# Patient Record
Sex: FEMALE | Race: OTHER | NOT HISPANIC OR LATINO
[De-identification: names, ages, dates, MRNs, and addresses within clinical notes are randomized per-mention and may not be internally consistent; named-entity substitution may affect disease eponyms.]

---

## 2019-02-01 ENCOUNTER — RECORD ABSTRACTING (OUTPATIENT)
Age: 64
End: 2019-02-01

## 2019-02-01 DIAGNOSIS — M26.629 ARTHRALGIA OF TEMPOROMANDIBULAR JOINT,: ICD-10-CM

## 2019-02-01 DIAGNOSIS — Z83.3 FAMILY HISTORY OF DIABETES MELLITUS: ICD-10-CM

## 2019-02-01 DIAGNOSIS — Z78.9 OTHER SPECIFIED HEALTH STATUS: ICD-10-CM

## 2019-02-01 RX ORDER — MULTIVITAMIN
TABLET ORAL
Refills: 0 | Status: ACTIVE | COMMUNITY

## 2019-02-01 RX ORDER — ESOMEPRAZOLE MAGNESIUM 40 MG/1
CAPSULE, DELAYED RELEASE ORAL
Refills: 0 | Status: ACTIVE | COMMUNITY

## 2019-02-01 RX ORDER — RANITIDINE HCL 150 MG
CAPSULE ORAL
Refills: 0 | Status: ACTIVE | COMMUNITY

## 2019-02-01 RX ORDER — CYCLOSPORINE 0.5 MG/ML
0.05 EMULSION OPHTHALMIC
Refills: 0 | Status: ACTIVE | COMMUNITY

## 2019-02-01 RX ORDER — TRAMADOL HYDROCHLORIDE 50 MG/1
TABLET, COATED ORAL
Refills: 0 | Status: ACTIVE | COMMUNITY

## 2019-02-01 RX ORDER — TIMOLOL MALEATE 5 MG/ML
SOLUTION OPHTHALMIC
Refills: 0 | Status: ACTIVE | COMMUNITY

## 2019-02-04 ENCOUNTER — APPOINTMENT (OUTPATIENT)
Dept: INTERNAL MEDICINE | Facility: CLINIC | Age: 64
End: 2019-02-04
Payer: MEDICARE

## 2019-02-04 VITALS
HEIGHT: 63 IN | WEIGHT: 175 LBS | TEMPERATURE: 98.6 F | BODY MASS INDEX: 31.01 KG/M2 | DIASTOLIC BLOOD PRESSURE: 80 MMHG | SYSTOLIC BLOOD PRESSURE: 132 MMHG

## 2019-02-04 PROCEDURE — 99213 OFFICE O/P EST LOW 20 MIN: CPT

## 2019-02-04 NOTE — HISTORY OF PRESENT ILLNESS
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 Low back pain seeing dr. Avila--prior epidural--Discussed shingrex and pt given script

## 2019-02-04 NOTE — PHYSICAL EXAM
[No Acute Distress] : no acute distress [Supple] : supple [No Respiratory Distress] : no respiratory distress  [Normal Rate] : normal rate  [Regular Rhythm] : with a regular rhythm

## 2019-02-04 NOTE — HISTORY OF PRESENT ILLNESS
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 Low back pain seeing dr. Avila--prior epidural--Discussed shingrex and pt given script

## 2019-06-20 ENCOUNTER — APPOINTMENT (OUTPATIENT)
Dept: INTERNAL MEDICINE | Facility: CLINIC | Age: 64
End: 2019-06-20
Payer: MEDICARE

## 2019-06-20 ENCOUNTER — NON-APPOINTMENT (OUTPATIENT)
Age: 64
End: 2019-06-20

## 2019-06-20 VITALS
RESPIRATION RATE: 16 BRPM | SYSTOLIC BLOOD PRESSURE: 130 MMHG | HEIGHT: 63 IN | BODY MASS INDEX: 31.36 KG/M2 | DIASTOLIC BLOOD PRESSURE: 85 MMHG | WEIGHT: 177 LBS

## 2019-06-20 PROCEDURE — 93000 ELECTROCARDIOGRAM COMPLETE: CPT

## 2019-06-20 PROCEDURE — G0403: CPT

## 2019-06-20 PROCEDURE — G0444 DEPRESSION SCREEN ANNUAL: CPT | Mod: 59

## 2019-06-20 PROCEDURE — G0402 INITIAL PREVENTIVE EXAM: CPT

## 2019-06-20 PROCEDURE — G0439: CPT

## 2019-06-20 PROCEDURE — 36415 COLL VENOUS BLD VENIPUNCTURE: CPT

## 2019-06-20 NOTE — HEALTH RISK ASSESSMENT
[Very Good] : ~his/her~  mood as very good [No] : No [No falls in past year] : Patient reported no falls in the past year [0] : 2) Feeling down, depressed, or hopeless: Not at all (0) [No Retinopathy] : No retinopathy [Patient reported mammogram was normal] : Patient reported mammogram was normal [Patient reported colonoscopy was normal] : Patient reported colonoscopy was normal [Patient reported PAP Smear was normal] : Patient reported PAP Smear was normal [Patient reported bone density results were abnormal] : Patient reported bone density results were abnormal [Alone] : lives alone [Fully functional (bathing, dressing, toileting, transferring, walking, feeding)] : Fully functional (bathing, dressing, toileting, transferring, walking, feeding) [Fully functional (using the telephone, shopping, preparing meals, housekeeping, doing laundry, using] : Fully functional and needs no help or supervision to perform IADLs (using the telephone, shopping, preparing meals, housekeeping, doing laundry, using transportation, managing medications and managing finances) [Reports normal functional visual acuity (ie: able to read med bottle)] : Reports normal functional visual acuity [Smoke Detector] : smoke detector [Carbon Monoxide Detector] : carbon monoxide detector [Seat Belt] :  uses seat belt [Sunscreen] : uses sunscreen [With Patient/Caregiver] : With Patient/Caregiver [] : No [[id number]] : 0 [EyeExamDate] : 05/19 [Reports changes in hearing] : Reports no changes in hearing [Reports changes in vision] : Reports no changes in vision [Change in mental status noted] : No change in mental status noted [Travel to Developing Areas] : does not  travel to developing areas [Reports changes in dental health] : Reports no changes in dental health [Guns at Home] : no guns at home [Caregiver Concerns] : does not have caregiver concerns [TB Exposure] : is not being exposed to tuberculosis [MammogramDate] : 01/18 [BoneDensityDate] : 5/19 [BoneDensityComments] : osteopenia  t -1.8 hip [PapSmearDate] : 06/17 [ColonoscopyDate] : 05/13 [ColonoscopyComments] : olivia [AdvancecareDate] : 06/19

## 2019-06-20 NOTE — PHYSICAL EXAM
[No Acute Distress] : no acute distress [Well Nourished] : well nourished [Well Developed] : well developed [Well-Appearing] : well-appearing [Normal Sclera/Conjunctiva] : normal sclera/conjunctiva [PERRL] : pupils equal round and reactive to light [EOMI] : extraocular movements intact [Normal Outer Ear/Nose] : the outer ears and nose were normal in appearance [Normal Oropharynx] : the oropharynx was normal [Supple] : supple [No JVD] : no jugular venous distention [Thyroid Normal, No Nodules] : the thyroid was normal and there were no nodules present [No Lymphadenopathy] : no lymphadenopathy [No Respiratory Distress] : no respiratory distress  [Clear to Auscultation] : lungs were clear to auscultation bilaterally [No Accessory Muscle Use] : no accessory muscle use [Normal Rate] : normal rate  [Regular Rhythm] : with a regular rhythm [Normal S1, S2] : normal S1 and S2 [No Murmur] : no murmur heard [No Carotid Bruits] : no carotid bruits [No Abdominal Bruit] : a ~M bruit was not heard ~T in the abdomen [No Edema] : there was no peripheral edema [No Varicosities] : no varicosities [Pedal Pulses Present] : the pedal pulses are present [No Extremity Clubbing/Cyanosis] : no extremity clubbing/cyanosis [No Palpable Aorta] : no palpable aorta [Soft] : abdomen soft [Non Tender] : non-tender [No Masses] : no abdominal mass palpated [Non-distended] : non-distended [No HSM] : no HSM [Normal Bowel Sounds] : normal bowel sounds [No CVA Tenderness] : no CVA  tenderness [Normal Anterior Cervical Nodes] : no anterior cervical lymphadenopathy [Normal Posterior Cervical Nodes] : no posterior cervical lymphadenopathy [No Joint Swelling] : no joint swelling [Grossly Normal Strength/Tone] : grossly normal strength/tone [No Spinal Tenderness] : no spinal tenderness [Normal Gait] : normal gait [No Rash] : no rash [Coordination Grossly Intact] : coordination grossly intact [No Focal Deficits] : no focal deficits [Deep Tendon Reflexes (DTR)] : deep tendon reflexes were 2+ and symmetric [Normal Affect] : the affect was normal [Normal Insight/Judgement] : insight and judgment were intact

## 2019-06-20 NOTE — HISTORY OF PRESENT ILLNESS
[FreeTextEntry1] : cpx [de-identified] : seeing ortho for low back pain ([facility])--some borderline bp's

## 2019-06-24 ENCOUNTER — TRANSCRIPTION ENCOUNTER (OUTPATIENT)
Age: 64
End: 2019-06-24

## 2019-06-24 LAB
25(OH)D3 SERPL-MCNC: 39.8 NG/ML
ALBUMIN SERPL ELPH-MCNC: 4.5 G/DL
ALP BLD-CCNC: 97 U/L
ALT SERPL-CCNC: 19 U/L
ANION GAP SERPL CALC-SCNC: 16 MMOL/L
APPEARANCE: ABNORMAL
AST SERPL-CCNC: 16 U/L
BACTERIA: ABNORMAL
BASOPHILS # BLD AUTO: 0.03 K/UL
BASOPHILS NFR BLD AUTO: 0.3 %
BILIRUB SERPL-MCNC: 0.5 MG/DL
BILIRUBIN URINE: NEGATIVE
BLOOD URINE: NEGATIVE
BUN SERPL-MCNC: 30 MG/DL
CALCIUM SERPL-MCNC: 10.1 MG/DL
CHLORIDE SERPL-SCNC: 105 MMOL/L
CHOLEST SERPL-MCNC: 264 MG/DL
CHOLEST/HDLC SERPL: 3.9 RATIO
CO2 SERPL-SCNC: 21 MMOL/L
COLOR: YELLOW
CREAT SERPL-MCNC: 0.95 MG/DL
EOSINOPHIL # BLD AUTO: 0.1 K/UL
EOSINOPHIL NFR BLD AUTO: 1.1 %
ESTIMATED AVERAGE GLUCOSE: 131 MG/DL
GLUCOSE QUALITATIVE U: NEGATIVE
GLUCOSE SERPL-MCNC: 93 MG/DL
HBA1C MFR BLD HPLC: 6.2 %
HCT VFR BLD CALC: 52.7 %
HDLC SERPL-MCNC: 67 MG/DL
HGB BLD-MCNC: 15.7 G/DL
HYALINE CASTS: 3 /LPF
IMM GRANULOCYTES NFR BLD AUTO: 0.2 %
KETONES URINE: NEGATIVE
LDLC SERPL CALC-MCNC: 173 MG/DL
LEUKOCYTE ESTERASE URINE: NEGATIVE
LYMPHOCYTES # BLD AUTO: 3.09 K/UL
LYMPHOCYTES NFR BLD AUTO: 35.4 %
MAN DIFF?: NORMAL
MCHC RBC-ENTMCNC: 27.5 PG
MCHC RBC-ENTMCNC: 29.8 GM/DL
MCV RBC AUTO: 92.5 FL
MICROSCOPIC-UA: NORMAL
MONOCYTES # BLD AUTO: 0.66 K/UL
MONOCYTES NFR BLD AUTO: 7.6 %
NEUTROPHILS # BLD AUTO: 4.84 K/UL
NEUTROPHILS NFR BLD AUTO: 55.4 %
NITRITE URINE: NEGATIVE
PH URINE: 5.5
PLATELET # BLD AUTO: 316 K/UL
POTASSIUM SERPL-SCNC: 4.6 MMOL/L
PROT SERPL-MCNC: 6.9 G/DL
PROTEIN URINE: NORMAL
RBC # BLD: 5.7 M/UL
RBC # FLD: 14.5 %
RED BLOOD CELLS URINE: 1 /HPF
SODIUM SERPL-SCNC: 142 MMOL/L
SPECIFIC GRAVITY URINE: 1.03
SQUAMOUS EPITHELIAL CELLS: 7 /HPF
TRIGL SERPL-MCNC: 120 MG/DL
TSH SERPL-ACNC: 1.64 UIU/ML
UROBILINOGEN URINE: NORMAL
WBC # FLD AUTO: 8.74 K/UL
WHITE BLOOD CELLS URINE: 3 /HPF

## 2019-07-17 ENCOUNTER — RX RENEWAL (OUTPATIENT)
Age: 64
End: 2019-07-17

## 2019-07-22 ENCOUNTER — MEDICATION RENEWAL (OUTPATIENT)
Age: 64
End: 2019-07-22

## 2019-07-22 RX ORDER — LEVOTHYROXINE SODIUM 0.07 MG/1
75 TABLET ORAL
Qty: 90 | Refills: 1 | Status: DISCONTINUED | COMMUNITY
Start: 2019-02-04 | End: 2019-07-22

## 2019-07-24 ENCOUNTER — RX RENEWAL (OUTPATIENT)
Age: 64
End: 2019-07-24

## 2019-07-24 RX ORDER — LEVOTHYROXINE SODIUM 75 UG/1
75 TABLET ORAL
Qty: 90 | Refills: 1 | Status: ACTIVE | COMMUNITY
Start: 2019-07-24 | End: 1900-01-01

## 2020-02-25 ENCOUNTER — APPOINTMENT (OUTPATIENT)
Dept: INTERNAL MEDICINE | Facility: CLINIC | Age: 65
End: 2020-02-25
Payer: MEDICARE

## 2020-02-25 VITALS
HEIGHT: 63 IN | WEIGHT: 178 LBS | TEMPERATURE: 98.4 F | DIASTOLIC BLOOD PRESSURE: 70 MMHG | BODY MASS INDEX: 31.54 KG/M2 | HEART RATE: 61 BPM | OXYGEN SATURATION: 98 % | SYSTOLIC BLOOD PRESSURE: 112 MMHG

## 2020-02-25 DIAGNOSIS — K21.9 GASTRO-ESOPHAGEAL REFLUX DISEASE W/OUT ESOPHAGITIS: ICD-10-CM

## 2020-02-25 DIAGNOSIS — Z87.898 PERSONAL HISTORY OF OTHER SPECIFIED CONDITIONS: ICD-10-CM

## 2020-02-25 DIAGNOSIS — E03.9 HYPOTHYROIDISM, UNSPECIFIED: ICD-10-CM

## 2020-02-25 DIAGNOSIS — Z86.39 PERSONAL HISTORY OF OTHER ENDOCRINE, NUTRITIONAL AND METABOLIC DISEASE: ICD-10-CM

## 2020-02-25 DIAGNOSIS — Z00.00 ENCOUNTER FOR GENERAL ADULT MEDICAL EXAMINATION W/OUT ABNORMAL FINDINGS: ICD-10-CM

## 2020-02-25 PROCEDURE — 99213 OFFICE O/P EST LOW 20 MIN: CPT

## 2020-02-25 PROCEDURE — G0444 DEPRESSION SCREEN ANNUAL: CPT | Mod: 59

## 2020-02-25 NOTE — HISTORY OF PRESENT ILLNESS
[FreeTextEntry1] : cpx [de-identified] : still some chronic stable low back pain ([place]--occ epidural)--uses nsaid and ultram\par stable medication regimen\par uses ppi regularly

## 2020-02-26 RX ORDER — LEVOTHYROXINE SODIUM 0.07 MG/1
75 TABLET ORAL DAILY
Qty: 90 | Refills: 1 | Status: ACTIVE | COMMUNITY
Start: 1900-01-01 | End: 1900-01-01

## 2020-08-24 ENCOUNTER — APPOINTMENT (OUTPATIENT)
Dept: INTERNAL MEDICINE | Facility: CLINIC | Age: 65
End: 2020-08-24

## 2022-05-24 ENCOUNTER — APPOINTMENT (OUTPATIENT)
Dept: PAIN MANAGEMENT | Facility: CLINIC | Age: 67
End: 2022-05-24
Payer: MEDICARE

## 2022-05-24 VITALS — BODY MASS INDEX: 30.65 KG/M2 | HEIGHT: 63 IN | WEIGHT: 173 LBS

## 2022-05-24 PROCEDURE — 99213 OFFICE O/P EST LOW 20 MIN: CPT

## 2022-05-24 NOTE — HISTORY OF PRESENT ILLNESS
[Lower back] : lower back [Left Leg] : left leg [Dull/Aching] : dull/aching [Intermittent] : intermittent [Rest] : rest [Meds] : meds [Sitting] : sitting [Physical therapy] : physical therapy [Injection therapy] : injection therapy [Standing] : standing [Walking] : walking [Extending back] : extending back [] : no [FreeTextEntry8] : walking [FreeTextEntry9] : stretching  [de-identified] : lifting

## 2022-05-24 NOTE — ASSESSMENT
[FreeTextEntry1] : stable on current medication regimen- will refill meds\par  \par \par Medication Renewal\par \par The patient is stable on current pain medication with analgesia and without notable side effects or any obvious aberrant behaviors exhibited. \par There is clinically meaningful improvement in pain and function that outweighs risks to patient safety. I have discussed risks and realistic benefits of opioid therapy and patient and clinician responsibilities for managing therapy. Pain agreement has been signed. \par Will renew medication today.\par \par Follow up in 3 months or sooner if there are any problems. \par \par Conservative Care\par Continue Home exercises, stretching, activity modification, physical therapy, and conservative care.\par \par  NO NEW FINDINGS\par Patient denies new numbness, tingling, weakness, fevers, chills and bowel/bladder incontinence\par \par  \par I have consulted the  Registry for the purpose of reviewing the patient's controlled substance\par \par

## 2022-05-31 ENCOUNTER — APPOINTMENT (OUTPATIENT)
Dept: PAIN MANAGEMENT | Facility: CLINIC | Age: 67
End: 2022-05-31

## 2022-09-20 ENCOUNTER — APPOINTMENT (OUTPATIENT)
Dept: PAIN MANAGEMENT | Facility: CLINIC | Age: 67
End: 2022-09-20

## 2022-09-20 VITALS — HEIGHT: 63 IN | WEIGHT: 173 LBS | BODY MASS INDEX: 30.65 KG/M2

## 2022-09-20 PROCEDURE — 99214 OFFICE O/P EST MOD 30 MIN: CPT

## 2022-09-20 NOTE — HISTORY OF PRESENT ILLNESS
[Lower back] : lower back [Left Leg] : left leg [6] : 6 [3] : 3 [Dull/Aching] : dull/aching [Intermittent] : intermittent [Rest] : rest [Meds] : meds [Sitting] : sitting [Physical therapy] : physical therapy [Injection therapy] : injection therapy [Standing] : standing [Walking] : walking [Extending back] : extending back [FreeTextEntry1] : rx renewal  [] : no [FreeTextEntry8] : walking [FreeTextEntry9] : stretching  [de-identified] : lifting

## 2023-01-10 ENCOUNTER — APPOINTMENT (OUTPATIENT)
Dept: PAIN MANAGEMENT | Facility: CLINIC | Age: 68
End: 2023-01-10
Payer: MEDICARE

## 2023-01-10 VITALS — BODY MASS INDEX: 31.01 KG/M2 | WEIGHT: 175 LBS | HEIGHT: 63 IN

## 2023-01-10 PROCEDURE — 99214 OFFICE O/P EST MOD 30 MIN: CPT

## 2023-01-10 PROCEDURE — 73502 X-RAY EXAM HIP UNI 2-3 VIEWS: CPT

## 2023-01-10 NOTE — HISTORY OF PRESENT ILLNESS
[Lower back] : lower back [Left Leg] : left leg [6] : 6 [3] : 3 [Dull/Aching] : dull/aching [Intermittent] : intermittent [Rest] : rest [Meds] : meds [Sitting] : sitting [Physical therapy] : physical therapy [Injection therapy] : injection therapy [Standing] : standing [Walking] : walking [Extending back] : extending back [FreeTextEntry1] : rx renewal  [] : no [FreeTextEntry8] : walking [FreeTextEntry9] : stretching  [de-identified] : lifting

## 2023-01-10 NOTE — DISCUSSION/SUMMARY
[Medication Risks Reviewed] : Medication risks reviewed [de-identified] : Medication Renewal\par \par The patient is stable on current pain medication with analgesia and without notable side effects or any obvious aberrant behaviors exhibited. \par There is clinically meaningful improvement in pain and function that outweighs risks to patient safety. I have discussed risks and realistic benefits of opioid therapy and patient and clinician responsibilities for managing therapy. Pain agreement has been signed. \par Will renew medication today.\par \par Follow up in 4-6 weeks\par Follow up in 4-6 weeks or sooner if there are any problems.\par \par Conservative Care\par Continue Home exercises, stretching, activity modification, physical therapy, and conservative care.\par \par  NO NEW FINDINGS\par Patient denies new numbness, tingling, weakness, fevers, chills and bowel/bladder incontinence\par \par  \par I have consulted the  Registry for the purpose of reviewing the patient's controlled substance\par \par

## 2023-01-10 NOTE — DATA REVIEWED
[MRI] : MRI [Lumbar Spine] : lumbar spine [Report was reviewed and noted in the chart] : The report was reviewed and noted in the chart [FreeTextEntry1] : R hip Xray reviewed, joint space appears well maintained. Will consider MRI R hip if no improvement once TFESI completed for MRI findings.

## 2023-01-24 ENCOUNTER — APPOINTMENT (OUTPATIENT)
Dept: PAIN MANAGEMENT | Facility: CLINIC | Age: 68
End: 2023-01-24
Payer: MEDICARE

## 2023-01-24 PROCEDURE — 64484 NJX AA&/STRD TFRM EPI L/S EA: CPT | Mod: RT

## 2023-01-24 PROCEDURE — 64483 NJX AA&/STRD TFRM EPI L/S 1: CPT | Mod: RT

## 2023-01-24 NOTE — PROCEDURE
[FreeTextEntry3] : Date of Service: 01/24/2023 \par \par Account: [id number] \par \par Patient: VICKI STEEN \par \par YOB: 1955 \par \par Age: 67 year \par \par Surgeon:                                                        Robin Avila M.D.\par \par Assistant:                                                       None.\par \par Pre-Operative Diagnosis:                            Lumbosacral Radiculitis (M54.17)     \par \par Post Operative Diagnosis:                          Lumbosacral Radiculitis (M54.17)\par \par Procedure:                                                     Right L4-5, L5-S1 transformational epidural steroid injection under fluoroscopic guidance. \par \par Anesthesia:                                                    MAC\par \par \par This procedure was carried out using fluoroscopic guidance.  The risks and benefits of the procedure were discussed extensively with the patient.  The consent of the patient was obtained and the following procedure was performed. The patient was placed in the prone position on the fluoroscopic table and the lumbar area was prepped and draped in a sterile fashion.\par \par The right L4-5 and L5-S1 neural foramen were identified on right oblique  "august dog" anatomical view at the 6 o' clock position using fluoroscopic guidance, and the area was marked. The overlying skin and subcutaneous structures were anesthetized using sterile technique with 1% Lidocaine.  A 22 gauge spinal needle was directed toward the inferior (6 o'clock) position of the pedicle, which formed the roof of the identified foramen.  Once in the epidural space, after negative aspiration for heme and CSF, 1cc of Omnipaque contrast was injected to confirm epidural location and assess filling defects and rule out intravascular needle placement. \par \par The following contrast flow and epidurogram was observed: no intravascular or intrathecal flow pattern was noted.  No blood or CSF was aspirated. Omnipaque spread appeared to outline the right L4 and L5 nerve roots and spread medially into the epidural space.  \par \par After this, an injectate of 3 cc preservative free normal saline plus 40 mg of depo-medrol was injected in the epidural space at each of the two levels.\par \par The needle was subsequently removed.  Vital signs remained normal.  Pulse oximeter was used throughout the procedure and the patient's pulse and oxygen saturation remained within normal limits.  The patient tolerated the procedure well.  There were no complications.  The patient was instructed to apply ice over the injection sites for twenty minutes every two hours for the next 24 to 48 hours.  The patient was also instructed to contact me immediately if there were any problems.\par \brenton Avila M.D.\par

## 2023-02-07 ENCOUNTER — APPOINTMENT (OUTPATIENT)
Dept: PAIN MANAGEMENT | Facility: CLINIC | Age: 68
End: 2023-02-07
Payer: MEDICARE

## 2023-02-07 VITALS — BODY MASS INDEX: 31.01 KG/M2 | WEIGHT: 175 LBS | HEIGHT: 63 IN

## 2023-02-07 PROCEDURE — 99214 OFFICE O/P EST MOD 30 MIN: CPT

## 2023-02-07 NOTE — ASSESSMENT
[FreeTextEntry1] : R L4-5 L5-S1 TFESI with 80% relief of pain improved ROM and ADLS \par still with some of her normal pain on L Side, but prefers to hold off injections at this time

## 2023-02-07 NOTE — HISTORY OF PRESENT ILLNESS
[Lower back] : lower back [Left Leg] : left leg [6] : 6 [3] : 3 [Dull/Aching] : dull/aching [Intermittent] : intermittent [Rest] : rest [Meds] : meds [Sitting] : sitting [Physical therapy] : physical therapy [Injection therapy] : injection therapy [Standing] : standing [Walking] : walking [Extending back] : extending back [FreeTextEntry1] : 2/7/23: PP FU S/P RT L4-5 L5-S1 TFESI on 1/24/23 with 80% relief \par \par rx renewal  [] : no [FreeTextEntry8] : walking [FreeTextEntry9] : stretching  [de-identified] : lifting

## 2023-04-25 ENCOUNTER — APPOINTMENT (OUTPATIENT)
Dept: PAIN MANAGEMENT | Facility: CLINIC | Age: 68
End: 2023-04-25
Payer: MEDICARE

## 2023-04-25 VITALS — WEIGHT: 176 LBS | BODY MASS INDEX: 31.18 KG/M2 | HEIGHT: 63 IN

## 2023-04-25 PROCEDURE — 99214 OFFICE O/P EST MOD 30 MIN: CPT

## 2023-04-25 NOTE — DISCUSSION/SUMMARY
[Surgical risks reviewed] : Surgical risks reviewed [de-identified] : After discussing various treatment options with the patient including but not limited to oral medications, physical therapy, exercise,\par modalities as well as interventional spinal injections, we have decided with the following plan\par \par I personally reviewed the MRI/CT scan images and agree with the radiologist's report. The\par radiological findings were discussed with the patient.\par \par \par The risks, benefits, contents and alternatives to injection were explained in full to the patient. Risks outlined include but are not\par limited to infection,sepsis, bleeding, post-dural puncture headache, nerve damage, temporary increase in pain, syncopal episode,\par failure to resolve symptoms, allergic reaction, symptom recurrence, and elevation of blood sugar in diabetics. Cortisone may cause\par immunosuppression. Patient understands the risks. All questions were answered. After discussion of options, patient requested\par an injection. Information regarding the injection was given to the patient. Which medications to stop prior to the injection was\par explained to the patient as well.\par \par Follow up in 1-2 weeks post injection for re-evaluation.\par \par  Conservative Care\par Continue Home exercises, stretching, activity modification, physical therapy, and conservative care.\par

## 2023-04-25 NOTE — ASSESSMENT
[FreeTextEntry1] : R L4-5 L5-S1 TFESI with 80% relief of pain improved ROM and ADLS sustained several months \par \par pain is in lower back bl and into RLE\par

## 2023-04-25 NOTE — HISTORY OF PRESENT ILLNESS
[Lower back] : lower back [Left Leg] : left leg [6] : 6 [3] : 3 [Dull/Aching] : dull/aching [Intermittent] : intermittent [Rest] : rest [Meds] : meds [Sitting] : sitting [Physical therapy] : physical therapy [Injection therapy] : injection therapy [Standing] : standing [Walking] : walking [Extending back] : extending back [FreeTextEntry1] : 2/7/23: PP FU S/P RT L4-5 L5-S1 TFESI on 1/24/23 with 80% relief \par \par rx renewal  [] : no [FreeTextEntry8] : walking [FreeTextEntry9] : stretching  [de-identified] : lifting

## 2023-05-09 ENCOUNTER — APPOINTMENT (OUTPATIENT)
Dept: PAIN MANAGEMENT | Facility: CLINIC | Age: 68
End: 2023-05-09
Payer: MEDICARE

## 2023-05-09 PROCEDURE — 64483 NJX AA&/STRD TFRM EPI L/S 1: CPT | Mod: LT

## 2023-05-09 NOTE — PROCEDURE
[FreeTextEntry3] : Date of Service:  05/09/2023 \par \par Account: [id number] \par \par Patient:  VICKI STEEN \par  \par YOB: 1955 \par \par Age:  67 year \par     \par Surgeon:                              Robin Avila M.D.\par \par Assistant:                              None.\par \par Pre-Operative Diagnosis:     Lumbosacral Radiculitis (M54.17)                  \par  \par Post Operative Diagnosis:    Lumbosacral Radiculitis (M54.17)                 \par  \par Procedure:                              Right L4-5 transforaminal epidural steroid injection\par                                                   Left L4-5 transforaminal epidural steroid injection under fluoroscopic guidance. \par \par Anesthesia:                             MAC\par \par This procedure was carried out using fluoroscopic guidance.  The risks and benefits of the procedure were discussed extensively with the patient.  The consent of the patient was obtained and the following procedure was performed. The patient was placed in the prone position on the fluoroscopic table and the lumbar area was prepped and draped in a sterile fashion.\par \par The left L4-5 neural foramen was then identified on left oblique  "august dog" anatomical view at the 6 o' clock position using fluoroscopic guidance, and the area was marked. The overlying skin and subcutaneous structures were anesthetized using sterile technique with 1% Lidocaine.  A 22 gauge spinal needle was directed toward the inferior (6 o'clock) position of the pedicle, which formed the roof of the identified foramen.  Once in the epidural space, after negative aspiration for heme and CSF, 1cc of Omnipaque contrast was injected to confirm epidural location and assess filling defects and rule out intravascular needle placement. \par \par The following contrast flow observed: no intravascular or intrathecal flow pattern was noted.  No blood or CSF was aspirated. Omnipaque spread medially in epidural space.\par \par After this, an injectate of 3 cc preservative free normal saline plus 40 mg of depo-medrol was injected in the epidural space.\par \par The right L4-5 neural foramen was then identified on right oblique "august dog" anatomical view at the 6 o' clock position using fluoroscopic guidance, and the area was marked. The overlying skin and subcutaneous structures were anesthetized using sterile technique with 1% Lidocaine.  A 22 gauge spinal needle was directed toward the inferior (6o'clock) position of the pedicle, which formed the roof of the identified foramen.  Once in the epidural space, after negative aspiration for heme and CSF, 1cc of Omnipaque contrast was injected to confirm epidural location and assess filling defects and rule out intravascular needle placement. \par \par The following contrast flow observed: no intravascular or intrathecal flow pattern was noted.  No blood or CSF was aspirated. Omnipaque spread medially in epidural space. After this, an injectate of 3 cc preservative free normal saline plus 40 mg of depo-medrol was injected in the epidural space.\par \par The needle was subsequently removed.  Vital signs remained normal.  Pulse oximeter was used throughout the procedure and the patient's pulse and oxygen saturation remained within normal limits.  The patient tolerated the procedure well.  There were no complications.  The patient was instructed to apply ice over the injection sites for twenty minutes every two hours for the next 24 to 48 hours.  The patient was also instructed to contact me immediately if there were any problems.\par \par Robin Avila M.D.\par

## 2023-06-01 ENCOUNTER — APPOINTMENT (OUTPATIENT)
Dept: PAIN MANAGEMENT | Facility: CLINIC | Age: 68
End: 2023-06-01
Payer: MEDICARE

## 2023-06-01 VITALS — BODY MASS INDEX: 29.23 KG/M2 | HEIGHT: 63 IN | WEIGHT: 165 LBS

## 2023-06-01 PROCEDURE — 99213 OFFICE O/P EST LOW 20 MIN: CPT

## 2023-06-01 NOTE — DISCUSSION/SUMMARY
[de-identified] : \par After discussing various treatment options with the patient including but not limited to oral medications, physical therapy, exercise, modalities as well as interventional spinal injections, we have decided with the following plan: \par \par - pharmacological: \par   - c/w tramadol 50mg TID prn, #90tabs for 30 day supply, 2 refills, last prescribed 4/25/23. will refill this rx when needed\par   - start gabapentin 300mg TID\par   - c/w celebrex 200mg daily prn\par - Imaging: I personally reviewed the radiological images and agree with the radiologist's report. The radiological findings were discussed with the patient.\par - Interventional: consider repeat b/l L4-5 TFESI if pain worsens.\par - rehabilitative: new referral for PT given\par  \par - follow up 2 months \par \par Bo Marcos MD\par \par

## 2023-06-01 NOTE — PHYSICAL EXAM
[de-identified] : Gen: NAD\par Gait: antalgic\par Psych:  Mood appropriate for given condition\par ROM: limited AROM of the L spine in all planes, full ROM at ankles, knees and hips  \par Sensation: decreased to lt touch over L/R leg, otherwise intact to light touch in all dermatomes tested from L2-S1 bilaterally\par Reflexes: 2+ b/l patella and Achilles\par Palpation: Diffusely tender over lumbar paraspinals  -TTP over the b/l greater trochanters and bilateral SI joint\par Provacative tests: +SLR, and seated root (slump test) on the L/R, neg Gay, RAISA testing, FADIR testing\par \par 				Right	Left\par L2/3	Hip flexion		 5	 5\par L3/4	Knee Extension		 5	 5\par L4/5	Ankle Dorsiflexion 	 5	 5\par L5/S1	Great toe extension	 5	 5\par L4/5	Inversion		 5	 5\par L5/S1	Eversion		 5	 5\par L5/S1	Hip Abudction		 3	 3\par S1/2	Hip Extension		 3	 3\par S1/2	Hip Fexion		 5	 5\par \par

## 2023-06-01 NOTE — HISTORY OF PRESENT ILLNESS
[Left Leg] : left leg [7] : 7 [Dull/Aching] : dull/aching [Intermittent] : intermittent [Household chores] : household chores [Leisure] : leisure [Social interactions] : social interactions [Injection therapy] : injection therapy [Lower back] : lower back [Right Leg] : right leg [Radiating] : radiating [Sharp] : sharp [Shooting] : shooting [Stabbing] : stabbing [Standing] : standing [Walking] : walking [FreeTextEntry1] : Initial HPI:06/01/2023\par Ms. STEEN is a 67 year old F who presents for initial evaluation for low back and leg pain. Pain started 8 weeks ago and pain rated 7/10 or higher. It is not associated with any inciting injury. Pain radiates to down the L/R leg. Pain is described as shooting and electric shock when radiating.  Pain is worsened with sitting, coughing and bearing down. Patient has failed conservative treatment with acetaminophen, NSAIDs, muscle relaxants, and physical therapy/HEP. Patient denies loss of bowel/bladder function, new motor deficits, fevers, or chills. There is associated numbness/tingling. She recently received b/l L4-5 TFESI on 5/9/23 with 50% ongoing relief. Pain was affecting quality of life and ADLs, but has since improved after receiving TFESI. She takes tramadol 50mg TID #90 tabs for 30 day supply, 2 refills, without adverse effect. \par \par Images Reviewed: \par MRI L-spine 5/9/23\par \par Istop Reviewed: \par [id number]\par \par Pain Tx Hx: \par R L4-5, L5-S1 TFESI 1/24/23 - 80% relief\par B/L L4-5 TFESI 05/09/23 - 50% relief \par \par \par  [] : no [de-identified] : 05/09/23 [de-identified] : [place]  [de-identified] : mri

## 2023-08-15 ENCOUNTER — APPOINTMENT (OUTPATIENT)
Dept: PAIN MANAGEMENT | Facility: CLINIC | Age: 68
End: 2023-08-15
Payer: MEDICARE

## 2023-08-15 VITALS — WEIGHT: 165 LBS | BODY MASS INDEX: 29.23 KG/M2 | HEIGHT: 63 IN

## 2023-08-15 PROCEDURE — 99214 OFFICE O/P EST MOD 30 MIN: CPT

## 2023-08-15 NOTE — PHYSICAL EXAM
[de-identified] : Gen: NAD Gait: antalgic Psych:  Mood appropriate for given condition ROM: limited AROM of the L spine in all planes, full ROM at ankles, knees and hips   Sensation: decreased to lt touch over L/R leg, otherwise intact to light touch in all dermatomes tested from L2-S1 bilaterally Reflexes: 2+ b/l patella and Achilles Palpation: Diffusely tender over lumbar paraspinals  -TTP over the b/l greater trochanters and +TTP R SI joint Provacative tests: -SLR, and seated root (slump test) on the L/R, neg Gay, +RAISA testing R, +R gaenslen, +R SI compression, FADIR testing  				Right	Left L2/3	Hip flexion		 5	 5 L3/4	Knee Extension		 5	 5 L4/5	Ankle Dorsiflexion 	 5	 5 L5/S1	Great toe extension	 5	 5 L4/5	Inversion		 5	 5 L5/S1	Eversion		 5	 5 L5/S1	Hip Abudction		 3	 3 S1/2	Hip Extension		 3	 3 S1/2	Hip Fexion		 5	 5

## 2023-08-15 NOTE — DISCUSSION/SUMMARY
[de-identified] : After discussing various treatment options with the patient including but not limited to oral medications, physical therapy, exercise, modalities as well as interventional spinal injections, we have decided with the following plan:   - pharmacological:    - refill tramadol 50mg TID prn, #90tabs for 30 day supply   - refill celebrex 200mg daily prn   - naloxone made available - Imaging: I personally reviewed the radiological images and agree with the radiologist's report. The radiological findings were discussed with the patient. - Interventional: consider R SIJ steroid injection - rehabilitative: c/w PT    - follow up 1 month   - Patient evaluated for risk of misuse of opiates by using Opioid Risk Assessment Tool - Patient is taking opiates for longer than 6 weeks - Signed opioid agreement in chart - patient is on the lowest dose of opioid possible with analgesia, without notable side effects or any obvious aberrant behaviors exhibited, that improves ADL and quality of life - there is clinically meaningful improvement in pain and function that outweighs risks to patient safety. I have discussed risks and realistic benefits of opioid therapy, and patient and clinician responsibilities for managing therapy.  - I reminded the patient that pain medications may impair judgement and function, and that they should not drive, operate machinery, or make important decisions while impaired - The patient was provided Fall Risk counseling due to the prescribed medication(s). The patient is aware of the risks associated with their medication(s) - I have consulted the  Registry for the purpose of reviewing the patient's controlled substance - Continuing conservative care including home exercises, stretching, activity modification, physical therapy was encouraged  Bo Marcos MD  
(4) walks frequently

## 2023-08-15 NOTE — HISTORY OF PRESENT ILLNESS
[Lower back] : lower back [Left Leg] : left leg [Right Leg] : right leg [7] : 7 [Dull/Aching] : dull/aching [Radiating] : radiating [Sharp] : sharp [Shooting] : shooting [Stabbing] : stabbing [Intermittent] : intermittent [Household chores] : household chores [Leisure] : leisure [Social interactions] : social interactions [Injection therapy] : injection therapy [Standing] : standing [Walking] : walking [FreeTextEntry1] : Interval HPI 08/15/2023 Pt returns for medication refill. She continues PT, which helps to improve her pain. Pt continues tramadol and celebrex, which helps to alleviate her pain. She decided not to take gabapentin due to concern for side effects. She reports primarily R buttock pain, aching, throbbing, rated 7/10, aching, throbbing, worse with transitions from sitting to standing. Patient denies loss of bowel/bladder function, new motor deficits, fevers, or chills. There is associated numbness/tingling.  Initial HPI:06/01/2023 Ms. STEEN is a 67 year old F who presents for initial evaluation for low back and leg pain. Pain started 8 weeks ago and pain rated 7/10 or higher. It is not associated with any inciting injury. Pain radiates to down the L/R leg. Pain is described as shooting and electric shock when radiating.  Pain is worsened with sitting, coughing and bearing down. Patient has failed conservative treatment with acetaminophen, NSAIDs, muscle relaxants, and physical therapy/HEP. Patient denies loss of bowel/bladder function, new motor deficits, fevers, or chills. There is associated numbness/tingling. She recently received b/l L4-5 TFESI on 5/9/23 with 50% ongoing relief. Pain was affecting quality of life and ADLs, but has since improved after receiving TFESI. She takes tramadol 50mg TID #90 tabs for 30 day supply, 2 refills, without adverse effect.   Images Reviewed:  MRI L-spine 5/9/23  Istop Reviewed:  [id number]  Pain Tx Hx:  R L4-5, L5-S1 TFESI 1/24/23 - 80% relief B/L L4-5 TFESI 05/09/23 - 50% relief     [] : no [de-identified] : 05/09/23 [de-identified] : winston  [de-identified] : mri

## 2023-08-15 NOTE — REVIEW OF SYSTEMS
Patient seen in office by Dr Joanna Hilton 7/15/2021    Imaging report endorsed to provider for review. [Negative] : Heme/Lymph

## 2023-09-19 ENCOUNTER — APPOINTMENT (OUTPATIENT)
Dept: PAIN MANAGEMENT | Facility: CLINIC | Age: 68
End: 2023-09-19
Payer: MEDICARE

## 2023-09-19 VITALS — BODY MASS INDEX: 29.23 KG/M2 | WEIGHT: 165 LBS | HEIGHT: 63 IN

## 2023-09-19 PROCEDURE — 99214 OFFICE O/P EST MOD 30 MIN: CPT

## 2023-10-17 ENCOUNTER — APPOINTMENT (OUTPATIENT)
Dept: PAIN MANAGEMENT | Facility: CLINIC | Age: 68
End: 2023-10-17
Payer: MEDICARE

## 2023-10-17 VITALS — HEIGHT: 63 IN | WEIGHT: 165 LBS | BODY MASS INDEX: 29.23 KG/M2

## 2023-10-17 PROCEDURE — 99214 OFFICE O/P EST MOD 30 MIN: CPT

## 2023-11-14 ENCOUNTER — APPOINTMENT (OUTPATIENT)
Dept: PAIN MANAGEMENT | Facility: CLINIC | Age: 68
End: 2023-11-14

## 2023-11-14 ENCOUNTER — INPATIENT (INPATIENT)
Facility: HOSPITAL | Age: 68
LOS: 1 days | Discharge: HOME | DRG: 309 | End: 2023-11-16
Attending: STUDENT IN AN ORGANIZED HEALTH CARE EDUCATION/TRAINING PROGRAM | Admitting: STUDENT IN AN ORGANIZED HEALTH CARE EDUCATION/TRAINING PROGRAM
Payer: MEDICARE

## 2023-11-14 VITALS
DIASTOLIC BLOOD PRESSURE: 88 MMHG | RESPIRATION RATE: 19 BRPM | HEIGHT: 62 IN | OXYGEN SATURATION: 97 % | HEART RATE: 114 BPM | WEIGHT: 173.94 LBS | SYSTOLIC BLOOD PRESSURE: 142 MMHG | TEMPERATURE: 98 F

## 2023-11-14 DIAGNOSIS — E78.5 HYPERLIPIDEMIA, UNSPECIFIED: ICD-10-CM

## 2023-11-14 DIAGNOSIS — Z29.9 ENCOUNTER FOR PROPHYLACTIC MEASURES, UNSPECIFIED: ICD-10-CM

## 2023-11-14 DIAGNOSIS — E03.9 HYPOTHYROIDISM, UNSPECIFIED: ICD-10-CM

## 2023-11-14 DIAGNOSIS — H40.053 OCULAR HYPERTENSION, BILATERAL: ICD-10-CM

## 2023-11-14 DIAGNOSIS — M48.00 SPINAL STENOSIS, SITE UNSPECIFIED: ICD-10-CM

## 2023-11-14 DIAGNOSIS — I48.91 UNSPECIFIED ATRIAL FIBRILLATION: ICD-10-CM

## 2023-11-14 DIAGNOSIS — I48.92 UNSPECIFIED ATRIAL FLUTTER: ICD-10-CM

## 2023-11-14 DIAGNOSIS — H40.9 UNSPECIFIED GLAUCOMA: ICD-10-CM

## 2023-11-14 DIAGNOSIS — K21.9 GASTRO-ESOPHAGEAL REFLUX DISEASE WITHOUT ESOPHAGITIS: ICD-10-CM

## 2023-11-14 LAB
ALBUMIN SERPL ELPH-MCNC: 3.7 G/DL — SIGNIFICANT CHANGE UP (ref 3.5–5)
ALBUMIN SERPL ELPH-MCNC: 3.7 G/DL — SIGNIFICANT CHANGE UP (ref 3.5–5)
ALP SERPL-CCNC: 89 U/L — SIGNIFICANT CHANGE UP (ref 40–120)
ALP SERPL-CCNC: 89 U/L — SIGNIFICANT CHANGE UP (ref 40–120)
ALT FLD-CCNC: 34 U/L DA — SIGNIFICANT CHANGE UP (ref 10–60)
ALT FLD-CCNC: 34 U/L DA — SIGNIFICANT CHANGE UP (ref 10–60)
ANION GAP SERPL CALC-SCNC: 5 MMOL/L — SIGNIFICANT CHANGE UP (ref 5–17)
ANION GAP SERPL CALC-SCNC: 5 MMOL/L — SIGNIFICANT CHANGE UP (ref 5–17)
APTT BLD: 31.1 SEC — SIGNIFICANT CHANGE UP (ref 24.5–35.6)
APTT BLD: 31.1 SEC — SIGNIFICANT CHANGE UP (ref 24.5–35.6)
AST SERPL-CCNC: 19 U/L — SIGNIFICANT CHANGE UP (ref 10–40)
AST SERPL-CCNC: 19 U/L — SIGNIFICANT CHANGE UP (ref 10–40)
BASOPHILS # BLD AUTO: 0.02 K/UL — SIGNIFICANT CHANGE UP (ref 0–0.2)
BASOPHILS # BLD AUTO: 0.02 K/UL — SIGNIFICANT CHANGE UP (ref 0–0.2)
BASOPHILS NFR BLD AUTO: 0.2 % — SIGNIFICANT CHANGE UP (ref 0–2)
BASOPHILS NFR BLD AUTO: 0.2 % — SIGNIFICANT CHANGE UP (ref 0–2)
BILIRUB SERPL-MCNC: 0.7 MG/DL — SIGNIFICANT CHANGE UP (ref 0.2–1.2)
BILIRUB SERPL-MCNC: 0.7 MG/DL — SIGNIFICANT CHANGE UP (ref 0.2–1.2)
BUN SERPL-MCNC: 30 MG/DL — HIGH (ref 7–18)
BUN SERPL-MCNC: 30 MG/DL — HIGH (ref 7–18)
CALCIUM SERPL-MCNC: 9.5 MG/DL — SIGNIFICANT CHANGE UP (ref 8.4–10.5)
CALCIUM SERPL-MCNC: 9.5 MG/DL — SIGNIFICANT CHANGE UP (ref 8.4–10.5)
CHLORIDE SERPL-SCNC: 108 MMOL/L — SIGNIFICANT CHANGE UP (ref 96–108)
CHLORIDE SERPL-SCNC: 108 MMOL/L — SIGNIFICANT CHANGE UP (ref 96–108)
CO2 SERPL-SCNC: 28 MMOL/L — SIGNIFICANT CHANGE UP (ref 22–31)
CO2 SERPL-SCNC: 28 MMOL/L — SIGNIFICANT CHANGE UP (ref 22–31)
CREAT SERPL-MCNC: 1.38 MG/DL — HIGH (ref 0.5–1.3)
CREAT SERPL-MCNC: 1.38 MG/DL — HIGH (ref 0.5–1.3)
EGFR: 41 ML/MIN/1.73M2 — LOW
EGFR: 41 ML/MIN/1.73M2 — LOW
EOSINOPHIL # BLD AUTO: 0.05 K/UL — SIGNIFICANT CHANGE UP (ref 0–0.5)
EOSINOPHIL # BLD AUTO: 0.05 K/UL — SIGNIFICANT CHANGE UP (ref 0–0.5)
EOSINOPHIL NFR BLD AUTO: 0.6 % — SIGNIFICANT CHANGE UP (ref 0–6)
EOSINOPHIL NFR BLD AUTO: 0.6 % — SIGNIFICANT CHANGE UP (ref 0–6)
GLUCOSE SERPL-MCNC: 124 MG/DL — HIGH (ref 70–99)
GLUCOSE SERPL-MCNC: 124 MG/DL — HIGH (ref 70–99)
HCT VFR BLD CALC: 49.4 % — HIGH (ref 34.5–45)
HCT VFR BLD CALC: 49.4 % — HIGH (ref 34.5–45)
HGB BLD-MCNC: 16.1 G/DL — HIGH (ref 11.5–15.5)
HGB BLD-MCNC: 16.1 G/DL — HIGH (ref 11.5–15.5)
IMM GRANULOCYTES NFR BLD AUTO: 0.5 % — SIGNIFICANT CHANGE UP (ref 0–0.9)
IMM GRANULOCYTES NFR BLD AUTO: 0.5 % — SIGNIFICANT CHANGE UP (ref 0–0.9)
INR BLD: 0.94 RATIO — SIGNIFICANT CHANGE UP (ref 0.85–1.18)
INR BLD: 0.94 RATIO — SIGNIFICANT CHANGE UP (ref 0.85–1.18)
LIDOCAIN IGE QN: 25 U/L — SIGNIFICANT CHANGE UP (ref 13–75)
LIDOCAIN IGE QN: 25 U/L — SIGNIFICANT CHANGE UP (ref 13–75)
LYMPHOCYTES # BLD AUTO: 2.34 K/UL — SIGNIFICANT CHANGE UP (ref 1–3.3)
LYMPHOCYTES # BLD AUTO: 2.34 K/UL — SIGNIFICANT CHANGE UP (ref 1–3.3)
LYMPHOCYTES # BLD AUTO: 26.7 % — SIGNIFICANT CHANGE UP (ref 13–44)
LYMPHOCYTES # BLD AUTO: 26.7 % — SIGNIFICANT CHANGE UP (ref 13–44)
MAGNESIUM SERPL-MCNC: 2.2 MG/DL — SIGNIFICANT CHANGE UP (ref 1.6–2.6)
MAGNESIUM SERPL-MCNC: 2.2 MG/DL — SIGNIFICANT CHANGE UP (ref 1.6–2.6)
MCHC RBC-ENTMCNC: 28.9 PG — SIGNIFICANT CHANGE UP (ref 27–34)
MCHC RBC-ENTMCNC: 28.9 PG — SIGNIFICANT CHANGE UP (ref 27–34)
MCHC RBC-ENTMCNC: 32.6 GM/DL — SIGNIFICANT CHANGE UP (ref 32–36)
MCHC RBC-ENTMCNC: 32.6 GM/DL — SIGNIFICANT CHANGE UP (ref 32–36)
MCV RBC AUTO: 88.5 FL — SIGNIFICANT CHANGE UP (ref 80–100)
MCV RBC AUTO: 88.5 FL — SIGNIFICANT CHANGE UP (ref 80–100)
MONOCYTES # BLD AUTO: 0.68 K/UL — SIGNIFICANT CHANGE UP (ref 0–0.9)
MONOCYTES # BLD AUTO: 0.68 K/UL — SIGNIFICANT CHANGE UP (ref 0–0.9)
MONOCYTES NFR BLD AUTO: 7.8 % — SIGNIFICANT CHANGE UP (ref 2–14)
MONOCYTES NFR BLD AUTO: 7.8 % — SIGNIFICANT CHANGE UP (ref 2–14)
NEUTROPHILS # BLD AUTO: 5.64 K/UL — SIGNIFICANT CHANGE UP (ref 1.8–7.4)
NEUTROPHILS # BLD AUTO: 5.64 K/UL — SIGNIFICANT CHANGE UP (ref 1.8–7.4)
NEUTROPHILS NFR BLD AUTO: 64.2 % — SIGNIFICANT CHANGE UP (ref 43–77)
NEUTROPHILS NFR BLD AUTO: 64.2 % — SIGNIFICANT CHANGE UP (ref 43–77)
NRBC # BLD: 0 /100 WBCS — SIGNIFICANT CHANGE UP (ref 0–0)
NRBC # BLD: 0 /100 WBCS — SIGNIFICANT CHANGE UP (ref 0–0)
NT-PROBNP SERPL-SCNC: 652 PG/ML — HIGH (ref 0–125)
NT-PROBNP SERPL-SCNC: 652 PG/ML — HIGH (ref 0–125)
PLATELET # BLD AUTO: 317 K/UL — SIGNIFICANT CHANGE UP (ref 150–400)
PLATELET # BLD AUTO: 317 K/UL — SIGNIFICANT CHANGE UP (ref 150–400)
POTASSIUM SERPL-MCNC: 4.2 MMOL/L — SIGNIFICANT CHANGE UP (ref 3.5–5.3)
POTASSIUM SERPL-MCNC: 4.2 MMOL/L — SIGNIFICANT CHANGE UP (ref 3.5–5.3)
POTASSIUM SERPL-SCNC: 4.2 MMOL/L — SIGNIFICANT CHANGE UP (ref 3.5–5.3)
POTASSIUM SERPL-SCNC: 4.2 MMOL/L — SIGNIFICANT CHANGE UP (ref 3.5–5.3)
PROT SERPL-MCNC: 7.3 G/DL — SIGNIFICANT CHANGE UP (ref 6–8.3)
PROT SERPL-MCNC: 7.3 G/DL — SIGNIFICANT CHANGE UP (ref 6–8.3)
PROTHROM AB SERPL-ACNC: 10.7 SEC — SIGNIFICANT CHANGE UP (ref 9.5–13)
PROTHROM AB SERPL-ACNC: 10.7 SEC — SIGNIFICANT CHANGE UP (ref 9.5–13)
RBC # BLD: 5.58 M/UL — HIGH (ref 3.8–5.2)
RBC # BLD: 5.58 M/UL — HIGH (ref 3.8–5.2)
RBC # FLD: 13.1 % — SIGNIFICANT CHANGE UP (ref 10.3–14.5)
RBC # FLD: 13.1 % — SIGNIFICANT CHANGE UP (ref 10.3–14.5)
SARS-COV-2 RNA SPEC QL NAA+PROBE: SIGNIFICANT CHANGE UP
SARS-COV-2 RNA SPEC QL NAA+PROBE: SIGNIFICANT CHANGE UP
SODIUM SERPL-SCNC: 141 MMOL/L — SIGNIFICANT CHANGE UP (ref 135–145)
SODIUM SERPL-SCNC: 141 MMOL/L — SIGNIFICANT CHANGE UP (ref 135–145)
TROPONIN I, HIGH SENSITIVITY RESULT: 5.6 NG/L — SIGNIFICANT CHANGE UP
TROPONIN I, HIGH SENSITIVITY RESULT: 5.6 NG/L — SIGNIFICANT CHANGE UP
TSH SERPL-MCNC: 0.97 UU/ML — SIGNIFICANT CHANGE UP (ref 0.34–4.82)
TSH SERPL-MCNC: 0.97 UU/ML — SIGNIFICANT CHANGE UP (ref 0.34–4.82)
WBC # BLD: 8.77 K/UL — SIGNIFICANT CHANGE UP (ref 3.8–10.5)
WBC # BLD: 8.77 K/UL — SIGNIFICANT CHANGE UP (ref 3.8–10.5)
WBC # FLD AUTO: 8.77 K/UL — SIGNIFICANT CHANGE UP (ref 3.8–10.5)
WBC # FLD AUTO: 8.77 K/UL — SIGNIFICANT CHANGE UP (ref 3.8–10.5)

## 2023-11-14 PROCEDURE — 71045 X-RAY EXAM CHEST 1 VIEW: CPT | Mod: 26

## 2023-11-14 PROCEDURE — 99285 EMERGENCY DEPT VISIT HI MDM: CPT

## 2023-11-14 PROCEDURE — 99223 1ST HOSP IP/OBS HIGH 75: CPT | Mod: GC,AI

## 2023-11-14 RX ORDER — ENOXAPARIN SODIUM 100 MG/ML
80 INJECTION SUBCUTANEOUS EVERY 12 HOURS
Refills: 0 | Status: DISCONTINUED | OUTPATIENT
Start: 2023-11-14 | End: 2023-11-16

## 2023-11-14 RX ORDER — TRAMADOL HYDROCHLORIDE 50 MG/1
1 TABLET ORAL
Refills: 0 | DISCHARGE

## 2023-11-14 RX ORDER — ATORVASTATIN CALCIUM 80 MG/1
1 TABLET, FILM COATED ORAL
Refills: 0 | DISCHARGE

## 2023-11-14 RX ORDER — ATORVASTATIN CALCIUM 80 MG/1
10 TABLET, FILM COATED ORAL AT BEDTIME
Refills: 0 | Status: DISCONTINUED | OUTPATIENT
Start: 2023-11-14 | End: 2023-11-16

## 2023-11-14 RX ORDER — LEVOTHYROXINE SODIUM 125 MCG
1 TABLET ORAL
Refills: 0 | DISCHARGE

## 2023-11-14 RX ORDER — DORZOLAMIDE HYDROCHLORIDE TIMOLOL MALEATE 20; 5 MG/ML; MG/ML
1 SOLUTION/ DROPS OPHTHALMIC
Refills: 0 | Status: DISCONTINUED | OUTPATIENT
Start: 2023-11-14 | End: 2023-11-16

## 2023-11-14 RX ORDER — TRAMADOL HYDROCHLORIDE 50 MG/1
50 TABLET ORAL EVERY 6 HOURS
Refills: 0 | Status: DISCONTINUED | OUTPATIENT
Start: 2023-11-14 | End: 2023-11-16

## 2023-11-14 RX ORDER — METOPROLOL TARTRATE 50 MG
25 TABLET ORAL
Refills: 0 | Status: DISCONTINUED | OUTPATIENT
Start: 2023-11-14 | End: 2023-11-15

## 2023-11-14 RX ORDER — ACETAMINOPHEN 500 MG
650 TABLET ORAL EVERY 6 HOURS
Refills: 0 | Status: DISCONTINUED | OUTPATIENT
Start: 2023-11-14 | End: 2023-11-16

## 2023-11-14 RX ORDER — METOPROLOL TARTRATE 50 MG
5 TABLET ORAL ONCE
Refills: 0 | Status: COMPLETED | OUTPATIENT
Start: 2023-11-14 | End: 2023-11-14

## 2023-11-14 RX ORDER — DORZOLAMIDE HYDROCHLORIDE TIMOLOL MALEATE 20; 5 MG/ML; MG/ML
1 SOLUTION/ DROPS OPHTHALMIC
Refills: 0 | DISCHARGE

## 2023-11-14 RX ORDER — PANTOPRAZOLE SODIUM 20 MG/1
40 TABLET, DELAYED RELEASE ORAL
Refills: 0 | Status: DISCONTINUED | OUTPATIENT
Start: 2023-11-14 | End: 2023-11-16

## 2023-11-14 RX ORDER — LANOLIN ALCOHOL/MO/W.PET/CERES
3 CREAM (GRAM) TOPICAL AT BEDTIME
Refills: 0 | Status: DISCONTINUED | OUTPATIENT
Start: 2023-11-14 | End: 2023-11-16

## 2023-11-14 RX ORDER — SODIUM CHLORIDE 9 MG/ML
1000 INJECTION, SOLUTION INTRAVENOUS ONCE
Refills: 0 | Status: COMPLETED | OUTPATIENT
Start: 2023-11-14 | End: 2023-11-14

## 2023-11-14 RX ORDER — DILTIAZEM HCL 120 MG
10 CAPSULE, EXT RELEASE 24 HR ORAL ONCE
Refills: 0 | Status: DISCONTINUED | OUTPATIENT
Start: 2023-11-14 | End: 2023-11-14

## 2023-11-14 RX ORDER — LEVOTHYROXINE SODIUM 125 MCG
75 TABLET ORAL DAILY
Refills: 0 | Status: DISCONTINUED | OUTPATIENT
Start: 2023-11-14 | End: 2023-11-16

## 2023-11-14 RX ORDER — CYCLOSPORINE 0.5 MG/ML
1 EMULSION OPHTHALMIC
Refills: 0 | DISCHARGE

## 2023-11-14 RX ADMIN — Medication 5 MILLIGRAM(S): at 18:40

## 2023-11-14 RX ADMIN — SODIUM CHLORIDE 500 MILLILITER(S): 9 INJECTION, SOLUTION INTRAVENOUS at 18:39

## 2023-11-14 RX ADMIN — ATORVASTATIN CALCIUM 10 MILLIGRAM(S): 80 TABLET, FILM COATED ORAL at 22:56

## 2023-11-14 RX ADMIN — DORZOLAMIDE HYDROCHLORIDE TIMOLOL MALEATE 1 DROP(S): 20; 5 SOLUTION/ DROPS OPHTHALMIC at 22:57

## 2023-11-14 NOTE — H&P ADULT - ASSESSMENT
Patient is a 68F with PMHx of hypothyroidism, GERD (hiatal hernia), spinal stenosis, HLD, glaucoma and dry eye who was sent from the orthopedic office for tachycardia and A fib on EKG. Patient admitted for new onset atrial flutter.

## 2023-11-14 NOTE — ED PROVIDER NOTE - CCCP TRG CHIEF CMPLNT
[facility] - Emergency Dept  Gynecologic Oncology  H&P    Patient Name: Lyssa Gandara  MRN: [id number]  Admission Date: 1/2/2022  Primary Care Provider: Devin Fields MD   Principal Problem: Acute hypoxemic respiratory failure    Subjective:     Chief Complaint/Reason for Admission: shortness of breath    History of Present Illness:  Lyssa Gandara is a 52 yo with Stage IV LMS with PMH DM2, h/o PE, h/o stroke, asthma, HTN, and HLD who presents with worsening shortness of breath. She had her first round of doxorubicin chemotherapy on 12/27 which she tolerated well. However, on Tuesday 12/28 she experienced worsening SOB. She initially was able to manage at home with increased use of her duonebs, however the SOB ultimately worsened. She is unable to walk without dyspnea and has difficulty breathing at rest. She has a slight nonproductive cough and occasionally feels feverish, although she has not taken her temperature at home. Denies nausea/vomiting, diarrhea/constipation. She is urinating without difficulty. She has light vaginal bleeding that she wears a panty liner for. She reports back pain that is controlled with pain medication at home.  [place] [facility] [place] 15[place] with O2 sats at 85%. She was started on BiPaP in the ED; VSS. She was ultimately transitioned to 2L NC with improvement of O2 sat to 98%. Labs notable for lactate of 2.3, normal troponin/BNP, therapeutic INR 2.9, normal CBC, and elevated glucose of 378. A CTA was performed and demonstrated decreased clot burden of known PE, however enlargement of known left lung met to 14.5 cm, new pleural effusion, and enlargement of pulmonary nodules.    Oncology History   Leiomyosarcoma of uterus   11/26/2021 Imaging Significant Findings     CT C/A/P w/: Multiple bilateral pulmonary metastases. Large uterine mass.  No omental caking, lymphadenopathy, or ascites.      12/2/2021 Biopsy     CT guided biopsy of lung: ER-, MMR? LMS       12/13/2021 -  Chemotherapy     Treatment Summary   Plan Name: OP DOXORUBICIN Q3W  Treatment Goal: Palliative  Status: Active  Start Date: 12/13/2021 (Planned)  End Date: 8/1/2022 (Planned)  Provider: Darrick Archibald MD  Chemotherapy: DOXOrubicin chemo injection 136 mg, 75 mg/m2, Intravenous, Clinic/HOD 1 time, 0 of 12 cycles   12/27/2021-  Chemotherapy C1 Doxorubicin      Hospital Course:  No notes on file    Oncology Treatment Plan:   OP DOXORUBICIN Q3W    Past Medical History:   Diagnosis Date    Asthma     Carotid stenosis     Dyspnea on exertion     Hypertension 3/8/2016    Internal carotid artery dissection 2/26/2017    Long term current use of anticoagulant therapy 3/8/2016    Metastasis to lung 11/27/2021    PE (pulmonary embolism)     Hx of multiple DVT/PE    Presence of IVC filter     Scleroderma     Stroke 02/2017    Type 2 diabetes mellitus without complication      Past Surgical History:   Procedure Laterality Date    ARTERIAL THROMBECTOMY Right 02/26/2017    right extracranial ICA thrombectomy by Interventional Radiology    DANETTE FILTER PLACEMENT       Family History     Problem Relation (Age of Onset)    Breast cancer Mother (60)    Diabetes Mellitus Maternal Uncle    Hypertension Father    No Known Problems Brother, Maternal Grandmother, Maternal Grandfather, Paternal Grandmother, Paternal Grandfather, Maternal Aunt, Paternal Aunt, Paternal Uncle        Tobacco Use    Smoking status: Never Smoker    Smokeless tobacco: Never Used   Substance and Sexual Activity    Alcohol use: Yes     Alcohol/week: 0.0 standard drinks     Comment: 2 beers a day    Drug use: No    Sexual activity: Not Currently     Partners: Male     Birth control/protection: Post-menopausal, None       (Not in a hospital admission)      Review of patient's allergies indicates:   Allergen Reactions    Plasma, human, normal        Review of Systems   Constitutional: Positive for fatigue. Negative for chills and  fever.   Respiratory: Positive for cough and shortness of breath.    Cardiovascular: Negative for chest pain and palpitations.   Gastrointestinal: Negative for abdominal pain, constipation, diarrhea, nausea and vomiting.   Genitourinary: Positive for vaginal bleeding. Negative for dysuria, urgency, vaginal discharge and vaginal pain.   Musculoskeletal: Negative for arthralgias.   Integumentary:  Negative for rash.   Neurological: Negative for syncope and headaches.      Objective:     Vital Signs (Most Recent):  Temp: 98.7 °F (37.1 °C) (01/02/22 2345)  Pulse: (!) 128 (01/02/22 2345)  Resp: (!) 35 (01/02/22 2345)  BP: (!) 140/94 (01/02/22 2345)  SpO2: 98 % (01/02/22 2345) Vital Signs (24h Range):  Temp:  [98.7 °F (37.1 °C)] 98.7 °F (37.1 °C)  Pulse:  [128-147] 128  Resp:  [15-40] 35  SpO2:  [98 %-100 %] 98 %  BP: (140-147)/(33-94) 140/94     Weight: 68.9 kg (152 lb)  Body mass index is 26.93 kg/m².    Physical Exam:   Constitutional: She is oriented to person, place, and time. She appears well-developed and well-nourished. No distress.    HENT:   Head: Normocephalic and atraumatic.    Eyes: EOM are normal.     Cardiovascular: Normal rate.     Pulmonary/Chest: Tachypnea noted. No respiratory distress. She has decreased breath sounds in the left upper field, the left middle field and the left lower field.   Dyspnea noted. Able to speak in full sentences, but pauses frequently. On 2L NC.        Abdominal: Soft. There is no abdominal tenderness.             Musculoskeletal: Normal range of motion.       Neurological: She is alert and oriented to person, place, and time.    Skin: Skin is warm and dry. She is not diaphoretic.    Psychiatric: She has a normal mood and affect.       Laboratory:  CBC:   Recent Labs   Lab 01/02/22 2035 01/02/22 2038   WBC 5.37  --    HGB 11.0*  --    HCT 35.0* 35*   *  --     and CMP:   Recent Labs   Lab 01/02/22 2035      K 5.6*      CO2 20*   *   BUN 18    CREATININE 1.3   CALCIUM 9.7   PROT 8.0   ALBUMIN 3.1*   BILITOT 0.5   ALKPHOS 131   AST 28   ALT 11   ANIONGAP 14   EGFRNONAA 46.3*     PT/INR: 29.3/2.9  Lactate: 2.3  Troponin negative  BNP: 24    Diagnostic Results:  Imaging Results           CTA Chest Non-Coronary (PE Study) (Final result)  Result time 01/02/22 22:31:28    Final result by Teto Ontiveros MD (01/02/22 22:31:28)                 Impression:      1. No large new central pulmonary thromboembolism noting suboptimal bolus timing.  Decreased clot burden in the left pulmonary/left lower lobe arteries with some residual clot.  Chronic occlusion of the right inter lobar artery.  No right heart strain.  2. Interval enlargement of left lower lobe mass now measuring up to 14.5 cm.  Mass extends toward the mediastinum/left hilum as detailed above and results in rightward mediastinal shift.  3. Interval development of left pleural effusion, possibly loculated.  4. Interval enlargement of multiple bilateral pulmonary soft tissue masses.  5. Enlarged prevascular lymph node, new from prior.  6. Small pericardial effusion, new from prior.  This report was flagged in Epic as abnormal.    Electronically signed by resident: Oswaldo Mackay  Date:    01/02/2022  Time:    21:36    Electronically signed by: Teto Ontiveros MD  Date:    01/02/2022  Time:    22:31             Narrative:    EXAMINATION:  CTA CHEST NON CORONARY    CLINICAL HISTORY:  Pulmonary embolism (PE) suspected, high prob;    TECHNIQUE:  Low dose axial images, sagittal and coronal reformations were obtained from the thoracic inlet to the lung bases following the IV administration of 100 mL of Omnipaque 350.  Contrast timing was optimized to evaluate the pulmonary arteries.    COMPARISON:  CTA chest 11/26/2021, 08/05/2016    FINDINGS:  Base of Neck:  No significant abnormality.    Thoracic soft tissues:  No significant abnormality.    Aorta: Left-sided aortic arch with common origin of the  new onset afib brachiocephalic and left common carotid arteries.  The thoracic aorta is normal in caliber without significant calcific atherosclerosis .    Heart: Heart is not enlarged.  New small pericardial effusion. Coronary artery calcific atherosclerosis.    Pulmonary vasculature: Bolus timing suboptimal to evaluate for thromboembolism.  The pulmonary arteries distribute normally.  Decreased clot burden in the left pulmonary/left lower lobe artery with some residual clot.  Assessment of the right upper lobe artery limited by artifact from contrast bolus within the SVC.  Occlusion of the right interlobar artery similar to prior exams dating back to 08/05/2016.  No new large central pulmonary thromboembolism    Kim/Mediastinum:  Rightward mediastinal shift.  Enlarged prevascular lymph node measuring 1.5 cm in short axis (axial series 2, image 151) additional prominent mediastinal lymph nodes.    Airways:  Trachea is midline and proximal airways are patent without significant abnormality.    Lungs: CTA technique sacrifices pulmonary parenchymal fine detail and contrast resolution in order to optimize assessment of pulmonary arteries.  Interval enlargement of left lower lobe mass measuring 6.7 x 11.3 x 14.5 cm (axial series 2, image 260 and coronal series 601, image 165).  The mass extends toward the mediastinum/left hilum and appears to be abutting possibly encasing the left lower lobe pulmonary artery, left inferior pulmonary vein, and left lower lobe bronchus.  Mass is also abutting the aorta and left atrium this mass extends into the posterior left chest wall with suspected involvement of the left paraspinal musculature for an axial series 2, image 196).  Interval development of left pleural effusion which may be loculated given its presence laterally and within the major fissure.  There is associated volume loss mainly in the left lower lobe.  Interval enlargement of multiple bilateral pulmonary soft tissue masses, for  example a right upper lobe mass measuring 3.7 cm (axial series 2, image 102), previously measuring approximately 3.0 cm and left lower lobe mass measuring 3.5 cm (axial series 2, image 281), previously measuring approximately 2.7 cm.  Fibrotic change/scattered bands of subsegmental atelectasis mainly in the right middle and lower lobes.    Esophagus: Normal in course and caliber.    Upper abdomen: Partially visualized cholelithiasis.    Bones:  Degenerative changes of the visualized osseous structures without acute fracture or lytic or sclerotic lesions.                               X-Ray Chest AP Portable (Final result)  Result time 01/02/22 20:33:51    Final result by Teto Ontiveros MD (01/02/22 20:33:51)                 Impression:      Numerous bilateral pulmonary masses, similar to prior.    Interval increase in effusion at the left base.    Interval increased left upper lobe atelectasis partially silhouetting the upper left heart border.      Electronically signed by: Teto Ontiveros MD  Date:    01/02/2022  Time:    20:33             Narrative:    EXAMINATION:  XR CHEST AP PORTABLE    CLINICAL HISTORY:  sob;    TECHNIQUE:  Single frontal view of the chest was performed.    COMPARISON:  12/02/2021.    FINDINGS:  Numerous bilateral pulmonary masses, similar to prior.    Interval increase in effusion at the left base.  Interval increased left upper lobe atelectasis partially silhouetting the upper left heart border.    Heart and lungs otherwise appear unchanged when allowing for differences in technique and positioning.                                  Assessment/Plan:     * Acute hypoxemic respiratory failure  - Pulse:  [128-147] 128, Resp:  [15-40] 35, SpO2:  [98 %-100 %] 98 %  - Initially on 15L > BiPap > 2L NC  - Increased WOB, decreased L Lung sounds  - CTA with enlarged pulmonary mass (14.5cm), small loculated pleural effusion, enlargement of lung nodules, and decrease of known PE volume  - Will admit  for O2 and supportive care  - Currently on 2L NC, titrate as appropriate  - Tessalon pearls PRN for cough  - Anticipate need for home O2      Leiomyosarcoma of uterus  - see full onc history in HPI  - recent diagnosis of Stage IV LMS  - s/p C1 on 12/27/21, well-tolerated    HLD (hyperlipidemia)  - atorvastatin 10 mg qD    Metastasis to lung  - Known mets to lungs including LLL mass  - Interval worsening of pulmonary nodules, new loculated small pleural effusion, increasing size of known mass to 14.5 cm with mediastinal shift  - New O2 requirement, anticipate need for home O2    Cerebral infarction due to thrombosis of right carotid artery 2/26/17  - no residual deficits  - Continue home plavix 75 mg qD    Type 2 diabetes mellitus with renal complication  -  on arrival, given 5u per SSI  - continue determir 20 u qD  - SSI ordered (patient usually does home SSI, does not have scheduled mealtime insulin)  - BG AC qHS  - Last A1C 8.7    Asthma  - continue home albuterol nebs q4 hr  - continue home fluticasone inhaler BID  - continue home combivent rescue inhaler PRN    Essential hypertension  - BP: (140-147)/(33-94) 140/94  - no medications at home  - hydralazine 10 mg PRN for BP >180/110      Personal history of pulmonary embolism  - Decreased size of clot burden on CT  - continue home warfarin 2.5 mg qD  - PT/INR therapeutic at 29.3/2.9  - SCDs  - encourage ambulation when patient able to tolerate        Sonja Sequeira MD  Gynecologic Oncology  [facility] - Emergency Dept

## 2023-11-14 NOTE — H&P ADULT - HISTORY OF PRESENT ILLNESS
Patient is a 68F with PMHx of hypothyroidism, GERD (hiatal hernia), spinal stenosis, HLD, elevated intraocular pressure and dry eye who was sent from the orthopedic office for tachycardia and A fib on EKG. Per the patient, she has been having mild dyspnea on exertion and palpitation the past week, which she thought was due to GERD. She increased her nexium intake, which alleviated some of the symptoms. She went into the orthopedic office for transforaminal epidural steroid injection and was found to be tachycardia and AFib on EKG. Patient has not skipped or changed synthyoid regimen and takes them as prescribed. Denies any sick contacts or recent stressors. Currently, patient denies SOB at rest, dizziness, lightheadedness, fatigue, chest pain, N/V, palpitations, cough.     In ED   T 98.2  , /88  RR 19, O2 97 in RA  Patient is a 68F with PMHx of hypothyroidism, GERD (hiatal hernia), spinal stenosis, HLD, glaucoma and dry eye who was sent from the orthopedic office for tachycardia and A fib on EKG. Per the patient, she has been having mild dyspnea on exertion and palpitation the past week, which she thought was due to GERD. She increased her nexium intake, which alleviated some of the symptoms. She went into the orthopedic office for transforaminal epidural steroid injection and was found to be tachycardia and AFib on EKG. Patient has not skipped or changed synthyoid regimen and takes them as prescribed. Denies any sick contacts or recent stressors. Currently, patient denies SOB at rest, dizziness, lightheadedness, fatigue, chest pain, N/V, palpitations, cough.     In ED   T 98.2  , /88  RR 19, O2 97 in RA  EKG: A flutter with AV block

## 2023-11-14 NOTE — ED PROVIDER NOTE - ENMT, MLM
-- DO NOT REPLY / DO NOT REPLY ALL --  -- Message is from Engagement Center Operations (ECO) --    General Patient Message  Valencia, patient's sister, called stating that patient contacted her this morning stating that she thinks her medications may be off. Stated that she feels like she is high all day and does not like this feeling. She is unsure which medication could be causing this.   Valencia is asking if Krystyna Martínez can take a look at patient's medications and advise what they should do next. Does she need an appointment or change in her medications.     Caller Information       Type Contact Phone/Fax    11/14/2022 11:44 AM CST Phone (Incoming) VALENCIA COREA (Emergency Contact) [phone]        Alternative phone number: n/a    Can a detailed message be left? Yes    Message Turnaround: [place]:    Refer to site's KB page for routing instructions    Please give this turnaround time to the caller:   \"You can expect to receive a response 1-3 business days after your provider's clinical team reviews the message\"              
Writer spoke to patient sister, Lizz.  Lizz  mentioned that the patient stated to her that she feels high all the time. I then contacted the patient, patient reports feeling high and do not like that feeling. Denies chest pain, SOB.    Appointment scheduled  with ISAAC Greenwood 11/18/2022 at 11:30 am.  Message sent to  to assist with arranging transportation services to appointment.    Thank you,  JUDIE Morton  
Airway patent, Nasal mucosa clear. Mouth with normal mucosa. Throat has no vesicles, no oropharyngeal exudates and uvula is midline.

## 2023-11-14 NOTE — H&P ADULT - NSHPREVIEWOFSYSTEMS_GEN_ALL_CORE
CONSTITUTIONAL: No fever, weight loss, or fatigue  EYES: No eye pain, visual disturbances, or discharge  ENT:  No difficulty hearing, tinnitus, vertigo; No sinus or throat pain  NECK: No pain or stiffness  RESPIRATORY: No cough, wheezing, chills or hemoptysis; No Shortness of Breath  CARDIOVASCULAR: No chest pain, palpitations, passing out, dizziness, or leg swelling  GASTROINTESTINAL: No abdominal or epigastric pain. No nausea, vomiting, or hematemesis; No diarrhea or constipation. No melena or hematochezia.  GENITOURINARY: No dysuria, frequency, hematuria, or incontinence  NEUROLOGICAL: No headaches, memory loss, loss of strength, numbness, or tremors  SKIN: No itching, burning, rashes, or lesions   LYMPH Nodes: No enlarged glands  ENDOCRINE: No heat or cold intolerance; No hair loss  MUSCULOSKELETAL: + Chronic back pain; No joint pain or swelling; No muscle pain. No extremity pain  HEME/LYMPH: No easy bruising, or bleeding gums

## 2023-11-14 NOTE — H&P ADULT - NSHPPHYSICALEXAM_GEN_ALL_CORE
GENERAL: NAD, well-groomed, well-developed  HEAD:  Atraumatic, Normocephalic  EYES: EOMI, PERRLA, conjunctiva and sclera clear  ENMT: No tonsillar erythema, exudates, or enlargement; Moist mucous membranes, No lesions  NECK: Supple, normal appearance, No JVD;  NERVOUS SYSTEM:  Alert & Oriented X3,  Motor Strength 5/5 B/L upper and lower extremities, sensation intact  CHEST/LUNG: Lungs clear to auscultation bilaterally, No rales, rhonchi, wheezing   HEART: Irregular rate and rhythm; No murmurs, rubs, or gallops  ABDOMEN: Soft, Nontender, Nondistended; Bowel sounds present  EXTREMITIES:  2+ Peripheral Pulses, No clubbing, cyanosis, or edema  LYMPH: No lymphadenopathy noted  SKIN: No rashes or lesions;  Good capillary refill

## 2023-11-14 NOTE — ED PROVIDER NOTE - OBJECTIVE STATEMENT
68-year-old female history of thyroid disease, hyperlipidemia, chronic back pain presenting with new onset atrial fibrillation detected while back pain management office.  Patient was about to get epidural when new EKG changes were noted.  Patient reports dyspnea on exertion exertion with palpitations that are intermittent.  Reports her last EKG was in February and was not informed of abnormalities at that time. Denies any chest pain, fever, dizziness, leg pain or swelling.  Denies any history of CAD. 68-year-old female history of thyroid disease, hyperlipidemia, chronic back pain presenting with new onset atrial fibrillation detected while back pain management office.  Patient was preparing to get epidural when new EKG changes were noted.  Patient reports dyspnea on exertion exertion with palpitations that are intermittent.  Reports her last EKG was in February and was not informed of abnormalities at that time. Denies any chest pain, fever, dizziness, leg pain or swelling.  Denies any history of CAD.

## 2023-11-14 NOTE — ED ADULT NURSE NOTE - OBJECTIVE STATEMENT
Pt reports she was sent from MD office for elevated HR. Pt reports feeling mild chest palpations and SOB. Denies N/V and dizziness.

## 2023-11-14 NOTE — H&P ADULT - PROBLEM SELECTOR PLAN 1
In ED EKG: A flutter with AV block   s/p lopressor 5mg IVP   c/w lopressor 25mg BID, Lovenox 80mg BID  Consulted Cardio (Kaylee)  f/u echo In ED EKG: A flutter with AV block, new onset   s/p lopressor 5mg IVP   c/w lopressor 25mg BID  start Lovenox 80mg BID  f/u echo  telemetry  Consulted Cardio (Kaylee)

## 2023-11-14 NOTE — H&P ADULT - NSICDXFAMILYHX_GEN_ALL_CORE_FT
FAMILY HISTORY:  Father  Still living? No  FH: type 2 diabetes mellitus, Age at diagnosis: Age Unknown  FHx: myocardial infarction, Age at diagnosis: Age Unknown

## 2023-11-14 NOTE — H&P ADULT - PROBLEM SELECTOR PLAN 4
c/w dorzolemide-timolol eye drop BID  c/w cyclosporine eye 0.05% qd for dry eye c/w dorzolemide-timolol eye drop BID  on cyclosporine eye 0.05% qd for dry eye (pharmacy doesn't carry)

## 2023-11-14 NOTE — H&P ADULT - NSICDXPASTMEDICALHX_GEN_ALL_CORE_FT
PAST MEDICAL HISTORY:  Chronic GERD     HLD (hyperlipidemia)     Hypothyroidism     Raised intraocular pressure, bilateral     Spinal stenosis      PAST MEDICAL HISTORY:  Chronic GERD     Glaucoma, bilateral     HLD (hyperlipidemia)     Hypothyroidism     Spinal stenosis

## 2023-11-14 NOTE — H&P ADULT - ATTENDING COMMENTS
Patient is a 68F with PMHx of hypothyroidism, GERD (hiatal hernia), spinal stenosis, HLD, glaucoma and dry eye who was sent from the orthopedic office for afib with RVR. Was scheduled for epidural injection today, found to be tachycardic, ecg to eval tachycardia shown to have afib and was recommended to follow pcp. PCP then referred to emergency room. Patient has no active chest pain nor shortness of breath. No recent respiratory illness, no complaints regarding urination and bowel movements.    Patient endorsing a chronic use of Cerebrex daily for the past 2 years. Unknown if she has any renal dysfunction but a remote history of a passed kidney stone. Was told to drink more water on previous blood work.    Patient endorsing feeling at usual state of health but with further pressing, admitted to mild sob on exertion and palpitations. She thought these were related to her GERD and possible anxiety. In the Emergency room, she was noted to have aflutter with heart rate ~110-120s. Was originally ordered diltiazem but this was not given and was given metoprolol in its place with improvement in her heartrate to ~.    Physical Exam: NAD, pleasant, accompanied by son. Heart irregular. Lungs clear, abdomen obese, soft nontender, no lower extremity swelling. AAOx4, no focal neurological deficits, moving all extremities spontaneously, fingers no cyanosis, clubbing    # New Onset Atrial Flutter  # Chronic NSAID(TAPIA-2 inhibitor)  use 2/2 Lower back pain  # Acute Kidney Injury  #Erythrocytosis  #Hyperlipidemia  # GERD  #Hypothyroidism      1) New Onset Atrial Flutter  - Rate better controlled with metoprolol IV push, continue po metoprolol 25mg bid  - valvular v non- check echo  - CHADSVASC of 2 for age and sex- start lovenox subcutaenous 1mg/kg bid  - monitor on telemetry    2) Chronic Lower Back Pain  - Patient informed to immediately discontinue celecoxib- new cardiac issue and ori  - acetaminophen and tramadol as needed  - Recommended to initiate gabapentin- patient somewhat anxious, read bad things online regarding  --She can start inpatient and be monitored.    3) Acute Kidney Injury  - Unknown baseline, 1L isotonic fluids was given to patient  - Recheck in AM. Hold off all NSAIDs  - Can check renal lytes    4) Erythrocytosis  - 1L fluids given, recheck CBC in AM,   -If still elevated, may need    5) HLD  - recently started statin, continue    6) hypothyroidism  - Continue thyroid replacement therapy    7) GERD  - ppi therapy          Outpatient ECG which I interpreted as atrial flutter. Reviewed inpatient labs including CBC, CMP, and BNP. Reviewed ECG in the Emergency Room. Case discussed with cardiology.

## 2023-11-14 NOTE — ED PROVIDER NOTE - CLINICAL SUMMARY MEDICAL DECISION MAKING FREE TEXT BOX
68-year-old female with new onset A-fib.  Plan to check labs, EKG, provide medication for rate control and assess

## 2023-11-15 DIAGNOSIS — I48.91 UNSPECIFIED ATRIAL FIBRILLATION: ICD-10-CM

## 2023-11-15 LAB
ANION GAP SERPL CALC-SCNC: 3 MMOL/L — LOW (ref 5–17)
ANION GAP SERPL CALC-SCNC: 3 MMOL/L — LOW (ref 5–17)
BUN SERPL-MCNC: 25 MG/DL — HIGH (ref 7–18)
BUN SERPL-MCNC: 25 MG/DL — HIGH (ref 7–18)
CALCIUM SERPL-MCNC: 9.8 MG/DL — SIGNIFICANT CHANGE UP (ref 8.4–10.5)
CALCIUM SERPL-MCNC: 9.8 MG/DL — SIGNIFICANT CHANGE UP (ref 8.4–10.5)
CHLORIDE SERPL-SCNC: 107 MMOL/L — SIGNIFICANT CHANGE UP (ref 96–108)
CHLORIDE SERPL-SCNC: 107 MMOL/L — SIGNIFICANT CHANGE UP (ref 96–108)
CO2 SERPL-SCNC: 29 MMOL/L — SIGNIFICANT CHANGE UP (ref 22–31)
CO2 SERPL-SCNC: 29 MMOL/L — SIGNIFICANT CHANGE UP (ref 22–31)
CREAT SERPL-MCNC: 1.27 MG/DL — SIGNIFICANT CHANGE UP (ref 0.5–1.3)
CREAT SERPL-MCNC: 1.27 MG/DL — SIGNIFICANT CHANGE UP (ref 0.5–1.3)
EGFR: 45 ML/MIN/1.73M2 — LOW
EGFR: 45 ML/MIN/1.73M2 — LOW
GLUCOSE SERPL-MCNC: 141 MG/DL — HIGH (ref 70–99)
GLUCOSE SERPL-MCNC: 141 MG/DL — HIGH (ref 70–99)
HCT VFR BLD CALC: 48.4 % — HIGH (ref 34.5–45)
HCT VFR BLD CALC: 48.4 % — HIGH (ref 34.5–45)
HCV AB S/CO SERPL IA: 0.07 S/CO — SIGNIFICANT CHANGE UP (ref 0–0.99)
HCV AB S/CO SERPL IA: 0.07 S/CO — SIGNIFICANT CHANGE UP (ref 0–0.99)
HCV AB SERPL-IMP: SIGNIFICANT CHANGE UP
HCV AB SERPL-IMP: SIGNIFICANT CHANGE UP
HGB BLD-MCNC: 15.8 G/DL — HIGH (ref 11.5–15.5)
HGB BLD-MCNC: 15.8 G/DL — HIGH (ref 11.5–15.5)
MAGNESIUM SERPL-MCNC: 2.3 MG/DL — SIGNIFICANT CHANGE UP (ref 1.6–2.6)
MAGNESIUM SERPL-MCNC: 2.3 MG/DL — SIGNIFICANT CHANGE UP (ref 1.6–2.6)
MCHC RBC-ENTMCNC: 28.8 PG — SIGNIFICANT CHANGE UP (ref 27–34)
MCHC RBC-ENTMCNC: 28.8 PG — SIGNIFICANT CHANGE UP (ref 27–34)
MCHC RBC-ENTMCNC: 32.6 GM/DL — SIGNIFICANT CHANGE UP (ref 32–36)
MCHC RBC-ENTMCNC: 32.6 GM/DL — SIGNIFICANT CHANGE UP (ref 32–36)
MCV RBC AUTO: 88.2 FL — SIGNIFICANT CHANGE UP (ref 80–100)
MCV RBC AUTO: 88.2 FL — SIGNIFICANT CHANGE UP (ref 80–100)
NRBC # BLD: 0 /100 WBCS — SIGNIFICANT CHANGE UP (ref 0–0)
NRBC # BLD: 0 /100 WBCS — SIGNIFICANT CHANGE UP (ref 0–0)
PHOSPHATE SERPL-MCNC: 3.2 MG/DL — SIGNIFICANT CHANGE UP (ref 2.5–4.5)
PHOSPHATE SERPL-MCNC: 3.2 MG/DL — SIGNIFICANT CHANGE UP (ref 2.5–4.5)
PLATELET # BLD AUTO: 296 K/UL — SIGNIFICANT CHANGE UP (ref 150–400)
PLATELET # BLD AUTO: 296 K/UL — SIGNIFICANT CHANGE UP (ref 150–400)
POTASSIUM SERPL-MCNC: 3.9 MMOL/L — SIGNIFICANT CHANGE UP (ref 3.5–5.3)
POTASSIUM SERPL-MCNC: 3.9 MMOL/L — SIGNIFICANT CHANGE UP (ref 3.5–5.3)
POTASSIUM SERPL-SCNC: 3.9 MMOL/L — SIGNIFICANT CHANGE UP (ref 3.5–5.3)
POTASSIUM SERPL-SCNC: 3.9 MMOL/L — SIGNIFICANT CHANGE UP (ref 3.5–5.3)
RBC # BLD: 5.49 M/UL — HIGH (ref 3.8–5.2)
RBC # BLD: 5.49 M/UL — HIGH (ref 3.8–5.2)
RBC # FLD: 13.2 % — SIGNIFICANT CHANGE UP (ref 10.3–14.5)
RBC # FLD: 13.2 % — SIGNIFICANT CHANGE UP (ref 10.3–14.5)
SODIUM SERPL-SCNC: 139 MMOL/L — SIGNIFICANT CHANGE UP (ref 135–145)
SODIUM SERPL-SCNC: 139 MMOL/L — SIGNIFICANT CHANGE UP (ref 135–145)
WBC # BLD: 7.5 K/UL — SIGNIFICANT CHANGE UP (ref 3.8–10.5)
WBC # BLD: 7.5 K/UL — SIGNIFICANT CHANGE UP (ref 3.8–10.5)
WBC # FLD AUTO: 7.5 K/UL — SIGNIFICANT CHANGE UP (ref 3.8–10.5)
WBC # FLD AUTO: 7.5 K/UL — SIGNIFICANT CHANGE UP (ref 3.8–10.5)

## 2023-11-15 PROCEDURE — 99233 SBSQ HOSP IP/OBS HIGH 50: CPT | Mod: GC

## 2023-11-15 RX ORDER — SODIUM CHLORIDE 9 MG/ML
500 INJECTION, SOLUTION INTRAVENOUS
Refills: 0 | Status: DISCONTINUED | OUTPATIENT
Start: 2023-11-15 | End: 2023-11-15

## 2023-11-15 RX ORDER — METOPROLOL TARTRATE 50 MG
50 TABLET ORAL
Refills: 0 | Status: DISCONTINUED | OUTPATIENT
Start: 2023-11-15 | End: 2023-11-16

## 2023-11-15 RX ADMIN — TRAMADOL HYDROCHLORIDE 50 MILLIGRAM(S): 50 TABLET ORAL at 22:14

## 2023-11-15 RX ADMIN — DORZOLAMIDE HYDROCHLORIDE TIMOLOL MALEATE 1 DROP(S): 20; 5 SOLUTION/ DROPS OPHTHALMIC at 17:59

## 2023-11-15 RX ADMIN — TRAMADOL HYDROCHLORIDE 50 MILLIGRAM(S): 50 TABLET ORAL at 21:09

## 2023-11-15 RX ADMIN — ENOXAPARIN SODIUM 80 MILLIGRAM(S): 100 INJECTION SUBCUTANEOUS at 17:59

## 2023-11-15 RX ADMIN — DORZOLAMIDE HYDROCHLORIDE TIMOLOL MALEATE 1 DROP(S): 20; 5 SOLUTION/ DROPS OPHTHALMIC at 07:36

## 2023-11-15 RX ADMIN — Medication 75 MICROGRAM(S): at 05:52

## 2023-11-15 RX ADMIN — Medication 50 MILLIGRAM(S): at 17:59

## 2023-11-15 RX ADMIN — TRAMADOL HYDROCHLORIDE 50 MILLIGRAM(S): 50 TABLET ORAL at 14:21

## 2023-11-15 RX ADMIN — Medication 25 MILLIGRAM(S): at 05:52

## 2023-11-15 RX ADMIN — TRAMADOL HYDROCHLORIDE 50 MILLIGRAM(S): 50 TABLET ORAL at 15:29

## 2023-11-15 RX ADMIN — ATORVASTATIN CALCIUM 10 MILLIGRAM(S): 80 TABLET, FILM COATED ORAL at 21:09

## 2023-11-15 RX ADMIN — ENOXAPARIN SODIUM 80 MILLIGRAM(S): 100 INJECTION SUBCUTANEOUS at 05:53

## 2023-11-15 RX ADMIN — PANTOPRAZOLE SODIUM 40 MILLIGRAM(S): 20 TABLET, DELAYED RELEASE ORAL at 05:53

## 2023-11-15 NOTE — PROGRESS NOTE ADULT - SUBJECTIVE AND OBJECTIVE BOX
PGY-1 Progress Note discussed with attending    PAGER #: [[phone]] TILL 5:00 PM  PLEASE CONTACT ON CALL TEAM:  - On Call Team (Please refer to Jose Francisco) FROM 5:00 PM - 8:30PM  - Nightfloat Team FROM 8:30 -7:30 AM    CC: Patient is a 68y old  Female who presents with a chief complaint of New onset atrial Flutter (14 Nov 2023 17:37)      OVERNIGHT EVENTS:    SUBJECTIVE / INTERVAL HPI: Patient seen and examined at bedside.     ROS:  CONSTITUTIONAL: No weakness, fevers or chills  EYES/ENT: No visual changes;  No vertigo or throat pain   NECK: No pain or stiffness  RESPIRATORY: No cough, wheezing, hemoptysis; No shortness of breath  CARDIOVASCULAR: No chest pain or palpitations  GASTROINTESTINAL: No abdominal or epigastric pain. No nausea, vomiting, or hematemesis; No diarrhea or constipation. No melena or hematochezia.  GENITOURINARY: No dysuria, frequency or hematuria  NEUROLOGICAL: No numbness or weakness  SKIN: No itching, burning, rashes, or lesions     VITAL SIGNS:  Vital Signs Last 24 Hrs  T(C): 36.9 (15 Nov 2023 08:39), Max: 37.1 (14 Nov 2023 18:45)  T(F): 98.4 (15 Nov 2023 08:39), Max: 98.8 (14 Nov 2023 18:45)  HR: 74 (15 Nov 2023 08:39) (74 - 130)  BP: 150/92 (15 Nov 2023 08:39) (117/80 - 150/92)  BP(mean): --  RR: 19 (15 Nov 2023 08:39) (18 - 19)  SpO2: 99% (15 Nov 2023 08:39) (97% - 99%)    Parameters below as of 15 Nov 2023 08:39  Patient On (Oxygen Delivery Method): room air        PHYSICAL EXAM:    General: WDWN  HEENT: NC/AT; PERRL, anicteric sclera; MMM  Neck: supple  Cardiovascular: +S1/S2; RRR  Respiratory: CTA B/L; no W/R/R  Gastrointestinal: soft, NT/ND; +BSx4  Extremities: WWP; no edema, clubbing or cyanosis  Vascular: 2+ radial, DP/PT pulses B/L  Skin: Warm, dry, good turgor, no rashes, or ecchymoses  Neurological: AAOx3; no focal deficits    MEDICATIONS:  MEDICATIONS  (STANDING):  atorvastatin 10 milliGRAM(s) Oral at bedtime  dorzolamide 2%/timolol 0.5% Ophthalmic Solution 1 Drop(s) Both EYES two times a day  enoxaparin Injectable 80 milliGRAM(s) SubCutaneous every 12 hours  lactated ringers. 500 milliLiter(s) (100 mL/Hr) IV Continuous <Continuous>  levothyroxine 75 MICROGram(s) Oral daily  metoprolol tartrate 25 milliGRAM(s) Oral two times a day  pantoprazole    Tablet 40 milliGRAM(s) Oral before breakfast    MEDICATIONS  (PRN):  acetaminophen     Tablet .. 650 milliGRAM(s) Oral every 6 hours PRN Temp greater or equal to 38C (100.4F), Mild Pain (1 - 3)  melatonin 3 milliGRAM(s) Oral at bedtime PRN Insomnia  traMADol 50 milliGRAM(s) Oral every 6 hours PRN for moderate pain      ALLERGIES:  Allergies    penicillin (Hives)  macrolide antibiotics (Stomach Upset; Nausea)  sulfa drugs (Hives)    Intolerances        LABS:                        15.8   7.50  )-----------( 296      ( 15 Nov 2023 05:46 )             48.4     11-15    139  |  107  |  25<H>  ----------------------------<  141<H>  3.9   |  29  |  1.27    Ca    9.8      15 Nov 2023 05:46  Phos  3.2     11-15  Mg     2.3     11-15    TPro  7.3  /  Alb  3.7  /  TBili  0.7  /  DBili  x   /  AST  19  /  ALT  34  /  AlkPhos  89  11-14    PT/INR - ( 14 Nov 2023 13:00 )   PT: 10.7 sec;   INR: 0.94 ratio         PTT - ( 14 Nov 2023 13:00 )  PTT:31.1 sec  Urinalysis Basic - ( 15 Nov 2023 05:46 )    Color: x / Appearance: x / SG: x / pH: x  Gluc: 141 mg/dL / Ketone: x  / Bili: x / Urobili: x   Blood: x / Protein: x / Nitrite: x   Leuk Esterase: x / RBC: x / WBC x   Sq Epi: x / Non Sq Epi: x / Bacteria: x      CAPILLARY BLOOD GLUCOSE          RADIOLOGY & ADDITIONAL TESTS: Reviewed. PGY-1 Progress Note discussed with attending    PAGER #: [[phone]] TILL 5:00 PM  PLEASE CONTACT ON CALL TEAM:  - On Call Team (Please refer to Jose Francisco) FROM 5:00 PM - 8:30PM  - Nightfloat Team FROM 8:30 -7:30 AM    CC: Patient is a 68y old  Female who presents with a chief complaint of New onset atrial Flutter (14 Nov 2023 17:37)      OVERNIGHT EVENTS:    SUBJECTIVE / INTERVAL HPI: Patient seen and examined at bedside. Says shortness of breath has improved but still endorsing palpitations. Denies history of hypertension but does mention possible white coat hypertension in the past. Denies headaches, dizziness, chest pain, SOB, nausea, vomiting, diarrhea, and constipation.     ROS:  CONSTITUTIONAL: No weakness, fevers or chills  EYES/ENT: No visual changes;  No vertigo or throat pain   NECK: No pain or stiffness  RESPIRATORY: No cough, wheezing, hemoptysis; No shortness of breath  CARDIOVASCULAR: No chest pain, +palpitations  GASTROINTESTINAL: No abdominal or epigastric pain. No nausea, vomiting, or hematemesis; No diarrhea or constipation. No melena or hematochezia.  GENITOURINARY: No dysuria, frequency or hematuria  NEUROLOGICAL: No numbness or weakness  SKIN: No itching, burning, rashes, or lesions     VITAL SIGNS:  Vital Signs Last 24 Hrs  T(C): 36.9 (15 Nov 2023 08:39), Max: 37.1 (14 Nov 2023 18:45)  T(F): 98.4 (15 Nov 2023 08:39), Max: 98.8 (14 Nov 2023 18:45)  HR: 74 (15 Nov 2023 08:39) (74 - 130)  BP: 150/92 (15 Nov 2023 08:39) (117/80 - 150/92)  BP(mean): --  RR: 19 (15 Nov 2023 08:39) (18 - 19)  SpO2: 99% (15 Nov 2023 08:39) (97% - 99%)    Parameters below as of 15 Nov 2023 08:39  Patient On (Oxygen Delivery Method): room air        PHYSICAL EXAM:    General: WDWN  HEENT: NC/AT; PERRL, anicteric sclera; MMM  Neck: supple  Cardiovascular: tachycardic, irregularly irregular  Respiratory: CTA B/L; no W/R/R  Gastrointestinal: soft, NT/ND; +BSx4  Extremities: WWP; no edema, clubbing or cyanosis  Vascular: 2+ radial, DP/PT pulses B/L  Skin: Warm, dry, good turgor, no rashes, or ecchymoses  Neurological: AAOx3; no focal deficits    MEDICATIONS:  MEDICATIONS  (STANDING):  atorvastatin 10 milliGRAM(s) Oral at bedtime  dorzolamide 2%/timolol 0.5% Ophthalmic Solution 1 Drop(s) Both EYES two times a day  enoxaparin Injectable 80 milliGRAM(s) SubCutaneous every 12 hours  lactated ringers. 500 milliLiter(s) (100 mL/Hr) IV Continuous <Continuous>  levothyroxine 75 MICROGram(s) Oral daily  metoprolol tartrate 25 milliGRAM(s) Oral two times a day  pantoprazole    Tablet 40 milliGRAM(s) Oral before breakfast    MEDICATIONS  (PRN):  acetaminophen     Tablet .. 650 milliGRAM(s) Oral every 6 hours PRN Temp greater or equal to 38C (100.4F), Mild Pain (1 - 3)  melatonin 3 milliGRAM(s) Oral at bedtime PRN Insomnia  traMADol 50 milliGRAM(s) Oral every 6 hours PRN for moderate pain      ALLERGIES:  Allergies    penicillin (Hives)  macrolide antibiotics (Stomach Upset; Nausea)  sulfa drugs (Hives)    Intolerances        LABS:                        15.8   7.50  )-----------( 296      ( 15 Nov 2023 05:46 )             48.4     11-15    139  |  107  |  25<H>  ----------------------------<  141<H>  3.9   |  29  |  1.27    Ca    9.8      15 Nov 2023 05:46  Phos  3.2     11-15  Mg     2.3     11-15    TPro  7.3  /  Alb  3.7  /  TBili  0.7  /  DBili  x   /  AST  19  /  ALT  34  /  AlkPhos  89  11-14    PT/INR - ( 14 Nov 2023 13:00 )   PT: 10.7 sec;   INR: 0.94 ratio         PTT - ( 14 Nov 2023 13:00 )  PTT:31.1 sec  Urinalysis Basic - ( 15 Nov 2023 05:46 )    Color: x / Appearance: x / SG: x / pH: x  Gluc: 141 mg/dL / Ketone: x  / Bili: x / Urobili: x   Blood: x / Protein: x / Nitrite: x   Leuk Esterase: x / RBC: x / WBC x   Sq Epi: x / Non Sq Epi: x / Bacteria: x      CAPILLARY BLOOD GLUCOSE          RADIOLOGY & ADDITIONAL TESTS: Reviewed

## 2023-11-15 NOTE — PATIENT PROFILE ADULT - STATED REASON FOR ADMISSION
Patient was told by her doctor to go to the nearest hospital because her EKG showed that she had Atrial fibrillation.

## 2023-11-15 NOTE — PROGRESS NOTE ADULT - NSPROGADDITIONALINFOA_GEN_ALL_CORE
Seen and examined in the AM, in good spirits. Ambulated around the unit with patient which caused her heart rate to increase to 115. She developed some shortness of breath after 2 rounds around the unit.    Reviewed telemetry readings since admission, reviewed am labs inclubing cbc and cmp. Discussed with cardiology regarding stress test and further management for afib    Physical exam: Pleasant no acute distress, heart appeared regular, lungs are clear, abd soft nontender, no lower extremity edema      1) Atrial Fibrillation with RVR  - afib per EP  - increased metoprolol to 50mg bid for better blood pressure and heart rate control.   - echo results pending  - CHADSVASC of 2 for age and sex- start lovenox subcutaenous 1mg/kg bid  - monitor on telemetry  - appreciate EP and cardio consult, further management depending on stress and echo results    2) Chronic Lower Back Pain  - Patient informed to immediately discontinue celecoxib- new cardiac issue and tj  - acetaminophen and tramadol as needed  - Recommended to initiate gabapentin- patient somewhat anxious, read bad things online regarding  --She can start inpatient and be monitored.    3) Acute Kidney Injury  - Creatinine 1/2023 was 1.02  - Likely has TJ  - 500cc bolus given    4) Erythrocytosis  - 1L fluids given, recheck CBC in AM,   - Check EPO primary vs secondary    5) HLD  - recently started statin, continue    6) hypothyroidism  - Continue thyroid replacement therapy    7) GERD  - ppi therapy

## 2023-11-15 NOTE — PATIENT PROFILE ADULT - FALL HARM RISK - HARM RISK INTERVENTIONS

## 2023-11-15 NOTE — CONSULT NOTE ADULT - SUBJECTIVE AND OBJECTIVE BOX
EP Attending  HISTORY OF PRESENT ILLNESS: HPI:  Patient is a 68F with PMHx of hypothyroidism, GERD (hiatal hernia), spinal stenosis, HLD, glaucoma and dry eye who was sent from the orthopedic office for tachycardia and A fib on EKG. Per the patient, she has been having mild dyspnea on exertion and palpitation the past week, which she thought was due to GERD. She increased her nexium intake, which alleviated some of the symptoms. She went into the orthopedic office for transforaminal epidural steroid injection and was found to be tachycardia and AFib on EKG. Patient has not skipped or changed synthyoid regimen and takes them as prescribed. Denies any sick contacts or recent stressors. Currently, patient denies SOB at rest, dizziness, lightheadedness, fatigue, chest pain, N/V, palpitations, cough.     In ED   T 98.2  , /88  RR 19, O2 97 in RA  EKG: AFIB   (14 Nov 2023 17:37)    Ms Dinner is a pleasant 67yo woman referred from pain mgmt procedure (aborted) due to abnormal preop vitals showing tachycardia. EKG with rapid AFIb.  Has been feeling SOB on exertion, easily fatigued, and some palpitations for about 3 weeks. No fainting, no angina.  Continues to have a 'wet throat' feeling.  Concerned it was GERD-related and did not think it was heart related.  No history of TIA or strokes.  A 10 pt ROS is otherwise negative.    PAST MEDICAL & SURGICAL HISTORY:  HLD (hyperlipidemia)  Spinal stenosis  Hypothyroidism  Chronic GERD  Glaucoma, bilateral    MEDICATIONS  (STANDING):  atorvastatin 10 milliGRAM(s) Oral at bedtime  dorzolamide 2%/timolol 0.5% Ophthalmic Solution 1 Drop(s) Both EYES two times a day  enoxaparin Injectable 80 milliGRAM(s) SubCutaneous every 12 hours  levothyroxine 75 MICROGram(s) Oral daily  metoprolol tartrate 50 milliGRAM(s) Oral two times a day  pantoprazole    Tablet 40 milliGRAM(s) Oral before breakfast    Allergies    penicillin (Hives)  macrolide antibiotics (Stomach Upset; Nausea)  sulfa drugs (Hives)    Intolerances      FAMILY HISTORY:  FH: type 2 diabetes mellitus (Father)    FHx: myocardial infarction (Father)    Non-contributary for premature coronary disease or sudden cardiac death    SOCIAL HISTORY:    [ x] Non-smoker  [ ] Smoker  [ ] Alcohol    PHYSICAL EXAM:  T(C): 36.9 (11-15-23 @ 11:15), Max: 37.1 (11-14-23 @ 18:45)  HR: 96 (11-15-23 @ 11:15) (74 - 130)  BP: 157/100 (11-15-23 @ 11:15) (117/80 - 157/100)  RR: 19 (11-15-23 @ 11:15) (18 - 19)  SpO2: 98% (11-15-23 @ 11:15) (97% - 99%)  Wt(kg): --    General: Well nourished, no acute distress, alert and oriented x 3  Head: normocephalic, no trauma  Neck: no JVD, no bruit, supple, not enlarged  CV: irregular S1S2, no S3, regular rate,no murmurs.    Lungs: clear BL, no rales or wheezes  Abdomen: bowel sounds +, soft, nontender, nondistended  Extremities: no clubbing, cyanosis or edema  Neuro: Moves all 4 extremities, sensation intact x 4 extremities  Skin: warm and moist, normal turgor  Psych: Mood and affect are appropriate for circumstances  MSK: normal range of motion and strength x4 extremities.      TELEMETRY: Coarse AFib.  atrial cycle length is ~170-190s (too fast to be 'flutter', and atrial activation changes every few seconds).	    ECG:  	AF/RVr, narrow QRS    Echo: pending  NST: pending  	  	  LABS:	 	                          15.8   7.50  )-----------( 296      ( 15 Nov 2023 05:46 )             48.4     11-15    139  |  107  |  25<H>  ----------------------------<  141<H>  3.9   |  29  |  1.27    Ca    9.8      15 Nov 2023 05:46  Phos  3.2     11-15  Mg     2.3     11-15    TPro  7.3  /  Alb  3.7  /  TBili  0.7  /  DBili  x   /  AST  19  /  ALT  34  /  AlkPhos  89  11-14  TSH: Thyroid Stimulating Hormone, Serum: 0.97 uU/mL (11-14 @ 13:00)    ASSESSMENT/PLAN: Ms Dinner is a very pleasant 68y Female sent in for abnormal vital signs pre- pain mgmt procedure.  New diagnosis of rapid AFib. Symptomatic with palpitations, SOB/easy fatigue on exertion, no fainting or angina.  Echo done, pending results. LA looks dilated. Awaiting nuclear stress test.  Continue Lovenox for stroke prevention, and continue metoprolol at current dose for heartrate control.  Further invasive workup depends on LVEF%/ valve function, and stress test findings.  If her symptoms improve on rate control, and noninvasive cardiac testing is reassuring, she could go home on a DOAC + rate control.  If she either fails to improve to normal level of function, or has heart failure / CAD, she should undergo MAKENZIE+Cardioversion before discharge.  She does not have a personal vehicle and requests followup in the [place] area - Dr Benavides on Thursdays.  It will be harder to schedule elective EP/Interventional procedures after she is discharged.  Will follow with you.  Maintain telemetry and maintain K >4, Mg>2.    Shaun Bello M.D.  Cardiac Electrophysiology    office [phone]  pager [phone]
C A R D I O L O G Y  *********************    DATE OF SERVICE: 11-15-23    HISTORY OF PRESENT ILLNESS:    Patient is a 68F with PMHx of hypothyroidism, GERD (hiatal hernia), spinal stenosis, HLD, glaucoma and dry eye who was sent from the orthopedic office for tachycardia and A fib on EKG. Per the patient, she has been having mild dyspnea on exertion and palpitation the past week, which she thought was due to GERD. She increased her nexium intake, which alleviated some of the symptoms. She went into the orthopedic office for transforaminal epidural steroid injection and was found to be tachycardia and AFib on EKG. Patient has not skipped or changed synthyoid regimen and takes them as prescribed. Denies any sick contacts or recent stressors. Currently, patient denies SOB at rest, dizziness, lightheadedness, fatigue, chest pain, N/V, palpitations, cough.   Denies known hx of CAD.  Last sters  12 years ago.  No dizziness of LOC    In ED   T 98.2  , /88  RR 19, O2 97 in RA  EKG: A flutter with AV block  (14 Nov 2023 17:37)      PAST MEDICAL & SURGICAL HISTORY:    HLD (hyperlipidemia)  Spinal stenosis  Hypothyroidism  Chronic GERD  Glaucoma, bilateral      MEDICATIONS:  MEDICATIONS  (STANDING):  atorvastatin 10 milliGRAM(s) Oral at bedtime  dorzolamide 2%/timolol 0.5% Ophthalmic Solution 1 Drop(s) Both EYES two times a day  enoxaparin Injectable 80 milliGRAM(s) SubCutaneous every 12 hours  lactated ringers. 500 milliLiter(s) (100 mL/Hr) IV Continuous <Continuous>  levothyroxine 75 MICROGram(s) Oral daily  metoprolol tartrate 25 milliGRAM(s) Oral two times a day  pantoprazole    Tablet 40 milliGRAM(s) Oral before breakfast      Allergies    penicillin (Hives)  macrolide antibiotics (Stomach Upset; Nausea)  sulfa drugs (Hives)    Intolerances        FAMILY HISTORY:  FH: type 2 diabetes mellitus (Father)    FHx: myocardial infarction (Father)      Non-contributary for premature coronary disease or sudden cardiac death    SOCIAL HISTORY:    [X ] Non-smoker  [ ] Smoker  [ ] Alcohol      REVIEW OF SYSTEMS:  [ ]chest pain  [ X ]shortness of breath  [ X ]palpitations  [  ]syncope  [ ]near syncope [ ]upper extremity weakness   [ ] lower extremity weakness  [  ]diplopia  [  ]altered mental status   [  ]fevers  [ ]chills [ ]nausea  [ ]vomitting  [  ]dysphagia    [ ]abdominal pain  [ ]melena  [ ]BRBPR    [  ]epistaxis  [  ]rash    [ ]lower extremity edema        [X] All others negative	  [ ] Unable to obtain      LABS:	 	    CARDIAC MARKERS:        Troponin I, High Sensitivity Result: 5.6 ng/L (11-14-23 @ 13:00)                            15.8   7.50  )-----------( 296      ( 15 Nov 2023 05:46 )             48.4     Hb Trend: 15.8<--, 16.1<--    11-15    139  |  107  |  25<H>  ----------------------------<  141<H>  3.9   |  29  |  1.27    Ca    9.8      15 Nov 2023 05:46  Phos  3.2     11-15  Mg     2.3     11-15    TPro  7.3  /  Alb  3.7  /  TBili  0.7  /  DBili  x   /  AST  19  /  ALT  34  /  AlkPhos  89  11-14    Creatinine Trend: 1.27<--, 1.38<--    TSH: Thyroid Stimulating Hormone, Serum: 0.97 uU/mL (11-14 @ 13:00)          PHYSICAL EXAM:  T(C): 36.9 (11-15-23 @ 08:39), Max: 37.1 (11-14-23 @ 18:45)  HR: 74 (11-15-23 @ 08:39) (74 - 130)  BP: 150/92 (11-15-23 @ 08:39) (117/80 - 150/92)  RR: 19 (11-15-23 @ 08:39) (18 - 19)  SpO2: 99% (11-15-23 @ 08:39) (97% - 99%)  Wt(kg): --   BMI (kg/m2): 31.8 (11-14-23 @ 12:18)  I&O's Summary    HEENT:  (-)icterus (-)pallor  CV: N S1 S2 1/6 QUINTEN (+)2 Pulses B/l  Resp:  Clear to ausculatation B/L, normal effort  GI: (+) BS Soft, NT, ND  Lymph:  (-)Edema, (-)obvious lymphadenopathy  Skin: Warm to touch, Normal turgor  Psych: Appropriate mood and affect        TELEMETRY: 	  afib    ECG:  	Aflutter 108 BPM, nonspecific T wave abnormality     RADIOLOGY:         CXR:   NONE    ASSESSMENT/PLAN: 	68y Female PMHx of hypothyroidism, GERD (hiatal hernia), spinal stenosis, HLD, glaucoma and dry eye who was sent from the orthopedic office for tachycardia and A fib on EKG.    # Afib  - Remains in afib this AM  - cont tele  - Therapeutic Lovenox  - Cont Lopressor 25 mg PO BID  - Echo  - check TSH    # SOB  - Plan for nuclear stress if echo wnl    I once again thank you for allowing me to participate in the care of your patient.  If you have any questions or concerns please do not hesitate to contact me.    Nathan Page MD, [facility]  BEEPER [phone]

## 2023-11-15 NOTE — PATIENT PROFILE ADULT - DO YOU FEEL UNSAFE AT HOME, WORK, OR SCHOOL?
Abdomen , soft, nontender, nondistended , no guarding or rigidity , no masses palpable , normal bowel sounds , no clear evidence of ascites, Liver and Spleen , no hepatosplenomegaly
no

## 2023-11-15 NOTE — PROGRESS NOTE ADULT - PROBLEM SELECTOR PLAN 1
In ED EKG: A flutter with AV block, new onset   s/p lopressor 5mg IVP   c/w lopressor 25mg BID  start Lovenox 80mg BID  f/u echo  telemetry  Consulted Cardio (Kaylee) In ED EKG: A flutter with variable AV block, new onset   telemetry consistent with Afib not Aflutter  s/p lopressor 5mg IVP   increase lopressor from 25 to 50mg BID  c/w Lovenox 80mg BID  CHADSVASC=2 (possibly 3 if undiagnosed HTN)  c/w telemetry  Consulted Cardio/EP (Kaylee/Bello)  f/u stress test  f/u echo  rate control vs. cardioversion vs cath pending results

## 2023-11-15 NOTE — PATIENT PROFILE ADULT - NSPRESCRALCFREQ_GEN_A_NUR
-Pt. With persistent erythema and pain to R eye since recent herpes zoster opthalmicus infection. Reports competing treatment but did not f/u with opthalmology and seems to have poor compliance history  - ophthalmology consulted, do not suspect active infection  - Recommend erythromycin ointment, artificial tears, and warm compresses   Never

## 2023-11-16 ENCOUNTER — TRANSCRIPTION ENCOUNTER (OUTPATIENT)
Age: 68
End: 2023-11-16

## 2023-11-16 VITALS
OXYGEN SATURATION: 97 % | RESPIRATION RATE: 18 BRPM | HEART RATE: 67 BPM | DIASTOLIC BLOOD PRESSURE: 81 MMHG | SYSTOLIC BLOOD PRESSURE: 146 MMHG | TEMPERATURE: 99 F

## 2023-11-16 LAB
A1C WITH ESTIMATED AVERAGE GLUCOSE RESULT: 6.4 % — HIGH (ref 4–5.6)
A1C WITH ESTIMATED AVERAGE GLUCOSE RESULT: 6.4 % — HIGH (ref 4–5.6)
ANION GAP SERPL CALC-SCNC: 4 MMOL/L — LOW (ref 5–17)
ANION GAP SERPL CALC-SCNC: 4 MMOL/L — LOW (ref 5–17)
BUN SERPL-MCNC: 24 MG/DL — HIGH (ref 7–18)
BUN SERPL-MCNC: 24 MG/DL — HIGH (ref 7–18)
CALCIUM SERPL-MCNC: 9.7 MG/DL — SIGNIFICANT CHANGE UP (ref 8.4–10.5)
CALCIUM SERPL-MCNC: 9.7 MG/DL — SIGNIFICANT CHANGE UP (ref 8.4–10.5)
CHLORIDE SERPL-SCNC: 108 MMOL/L — SIGNIFICANT CHANGE UP (ref 96–108)
CHLORIDE SERPL-SCNC: 108 MMOL/L — SIGNIFICANT CHANGE UP (ref 96–108)
CHOLEST SERPL-MCNC: 198 MG/DL — SIGNIFICANT CHANGE UP
CHOLEST SERPL-MCNC: 198 MG/DL — SIGNIFICANT CHANGE UP
CO2 SERPL-SCNC: 29 MMOL/L — SIGNIFICANT CHANGE UP (ref 22–31)
CO2 SERPL-SCNC: 29 MMOL/L — SIGNIFICANT CHANGE UP (ref 22–31)
CREAT SERPL-MCNC: 1.26 MG/DL — SIGNIFICANT CHANGE UP (ref 0.5–1.3)
CREAT SERPL-MCNC: 1.26 MG/DL — SIGNIFICANT CHANGE UP (ref 0.5–1.3)
EGFR: 46 ML/MIN/1.73M2 — LOW
EGFR: 46 ML/MIN/1.73M2 — LOW
ESTIMATED AVERAGE GLUCOSE: 137 MG/DL — HIGH (ref 68–114)
ESTIMATED AVERAGE GLUCOSE: 137 MG/DL — HIGH (ref 68–114)
GLUCOSE SERPL-MCNC: 117 MG/DL — HIGH (ref 70–99)
GLUCOSE SERPL-MCNC: 117 MG/DL — HIGH (ref 70–99)
HCT VFR BLD CALC: 46.9 % — HIGH (ref 34.5–45)
HCT VFR BLD CALC: 46.9 % — HIGH (ref 34.5–45)
HDLC SERPL-MCNC: 63 MG/DL — SIGNIFICANT CHANGE UP
HDLC SERPL-MCNC: 63 MG/DL — SIGNIFICANT CHANGE UP
HGB BLD-MCNC: 15.1 G/DL — SIGNIFICANT CHANGE UP (ref 11.5–15.5)
HGB BLD-MCNC: 15.1 G/DL — SIGNIFICANT CHANGE UP (ref 11.5–15.5)
LIPID PNL WITH DIRECT LDL SERPL: 107 MG/DL — HIGH
LIPID PNL WITH DIRECT LDL SERPL: 107 MG/DL — HIGH
MAGNESIUM SERPL-MCNC: 2.4 MG/DL — SIGNIFICANT CHANGE UP (ref 1.6–2.6)
MAGNESIUM SERPL-MCNC: 2.4 MG/DL — SIGNIFICANT CHANGE UP (ref 1.6–2.6)
MCHC RBC-ENTMCNC: 28.7 PG — SIGNIFICANT CHANGE UP (ref 27–34)
MCHC RBC-ENTMCNC: 28.7 PG — SIGNIFICANT CHANGE UP (ref 27–34)
MCHC RBC-ENTMCNC: 32.2 GM/DL — SIGNIFICANT CHANGE UP (ref 32–36)
MCHC RBC-ENTMCNC: 32.2 GM/DL — SIGNIFICANT CHANGE UP (ref 32–36)
MCV RBC AUTO: 89.2 FL — SIGNIFICANT CHANGE UP (ref 80–100)
MCV RBC AUTO: 89.2 FL — SIGNIFICANT CHANGE UP (ref 80–100)
NON HDL CHOLESTEROL: 135 MG/DL — HIGH
NON HDL CHOLESTEROL: 135 MG/DL — HIGH
NRBC # BLD: 0 /100 WBCS — SIGNIFICANT CHANGE UP (ref 0–0)
NRBC # BLD: 0 /100 WBCS — SIGNIFICANT CHANGE UP (ref 0–0)
PHOSPHATE SERPL-MCNC: 4 MG/DL — SIGNIFICANT CHANGE UP (ref 2.5–4.5)
PHOSPHATE SERPL-MCNC: 4 MG/DL — SIGNIFICANT CHANGE UP (ref 2.5–4.5)
PLATELET # BLD AUTO: 285 K/UL — SIGNIFICANT CHANGE UP (ref 150–400)
PLATELET # BLD AUTO: 285 K/UL — SIGNIFICANT CHANGE UP (ref 150–400)
POTASSIUM SERPL-MCNC: 4.1 MMOL/L — SIGNIFICANT CHANGE UP (ref 3.5–5.3)
POTASSIUM SERPL-MCNC: 4.1 MMOL/L — SIGNIFICANT CHANGE UP (ref 3.5–5.3)
POTASSIUM SERPL-SCNC: 4.1 MMOL/L — SIGNIFICANT CHANGE UP (ref 3.5–5.3)
POTASSIUM SERPL-SCNC: 4.1 MMOL/L — SIGNIFICANT CHANGE UP (ref 3.5–5.3)
RBC # BLD: 5.26 M/UL — HIGH (ref 3.8–5.2)
RBC # BLD: 5.26 M/UL — HIGH (ref 3.8–5.2)
RBC # FLD: 13.4 % — SIGNIFICANT CHANGE UP (ref 10.3–14.5)
RBC # FLD: 13.4 % — SIGNIFICANT CHANGE UP (ref 10.3–14.5)
SODIUM SERPL-SCNC: 141 MMOL/L — SIGNIFICANT CHANGE UP (ref 135–145)
SODIUM SERPL-SCNC: 141 MMOL/L — SIGNIFICANT CHANGE UP (ref 135–145)
TRIGL SERPL-MCNC: 138 MG/DL — SIGNIFICANT CHANGE UP
TRIGL SERPL-MCNC: 138 MG/DL — SIGNIFICANT CHANGE UP
WBC # BLD: 7.9 K/UL — SIGNIFICANT CHANGE UP (ref 3.8–10.5)
WBC # BLD: 7.9 K/UL — SIGNIFICANT CHANGE UP (ref 3.8–10.5)
WBC # FLD AUTO: 7.9 K/UL — SIGNIFICANT CHANGE UP (ref 3.8–10.5)
WBC # FLD AUTO: 7.9 K/UL — SIGNIFICANT CHANGE UP (ref 3.8–10.5)

## 2023-11-16 PROCEDURE — 99285 EMERGENCY DEPT VISIT HI MDM: CPT

## 2023-11-16 PROCEDURE — 87635 SARS-COV-2 COVID-19 AMP PRB: CPT

## 2023-11-16 PROCEDURE — G0452: CPT | Mod: 26

## 2023-11-16 PROCEDURE — 85610 PROTHROMBIN TIME: CPT

## 2023-11-16 PROCEDURE — 83690 ASSAY OF LIPASE: CPT

## 2023-11-16 PROCEDURE — 78452 HT MUSCLE IMAGE SPECT MULT: CPT

## 2023-11-16 PROCEDURE — 84484 ASSAY OF TROPONIN QUANT: CPT

## 2023-11-16 PROCEDURE — 84100 ASSAY OF PHOSPHORUS: CPT

## 2023-11-16 PROCEDURE — 85027 COMPLETE CBC AUTOMATED: CPT

## 2023-11-16 PROCEDURE — 80048 BASIC METABOLIC PNL TOTAL CA: CPT

## 2023-11-16 PROCEDURE — 93017 CV STRESS TEST TRACING ONLY: CPT

## 2023-11-16 PROCEDURE — 93306 TTE W/DOPPLER COMPLETE: CPT

## 2023-11-16 PROCEDURE — 99239 HOSP IP/OBS DSCHRG MGMT >30: CPT

## 2023-11-16 PROCEDURE — 83036 HEMOGLOBIN GLYCOSYLATED A1C: CPT

## 2023-11-16 PROCEDURE — 85730 THROMBOPLASTIN TIME PARTIAL: CPT

## 2023-11-16 PROCEDURE — 81270 JAK2 GENE: CPT

## 2023-11-16 PROCEDURE — 80053 COMPREHEN METABOLIC PANEL: CPT

## 2023-11-16 PROCEDURE — 83735 ASSAY OF MAGNESIUM: CPT

## 2023-11-16 PROCEDURE — 84443 ASSAY THYROID STIM HORMONE: CPT

## 2023-11-16 PROCEDURE — 86803 HEPATITIS C AB TEST: CPT

## 2023-11-16 PROCEDURE — 93005 ELECTROCARDIOGRAM TRACING: CPT

## 2023-11-16 PROCEDURE — 80061 LIPID PANEL: CPT

## 2023-11-16 PROCEDURE — 85025 COMPLETE CBC W/AUTO DIFF WBC: CPT

## 2023-11-16 PROCEDURE — 36415 COLL VENOUS BLD VENIPUNCTURE: CPT

## 2023-11-16 PROCEDURE — 71045 X-RAY EXAM CHEST 1 VIEW: CPT

## 2023-11-16 PROCEDURE — 82668 ASSAY OF ERYTHROPOIETIN: CPT

## 2023-11-16 PROCEDURE — 83880 ASSAY OF NATRIURETIC PEPTIDE: CPT

## 2023-11-16 PROCEDURE — A9502: CPT

## 2023-11-16 RX ORDER — RIVAROXABAN 15 MG-20MG
1 KIT ORAL
Qty: 90 | Refills: 0
Start: 2023-11-16 | End: 2024-02-13

## 2023-11-16 RX ORDER — AMLODIPINE BESYLATE 2.5 MG/1
1 TABLET ORAL
Qty: 30 | Refills: 0
Start: 2023-11-16 | End: 2023-12-15

## 2023-11-16 RX ORDER — APIXABAN 2.5 MG/1
1 TABLET, FILM COATED ORAL
Qty: 60 | Refills: 0
Start: 2023-11-16 | End: 2023-12-15

## 2023-11-16 RX ORDER — RIVAROXABAN 15 MG-20MG
1 KIT ORAL
Qty: 30 | Refills: 0
Start: 2023-11-16 | End: 2023-12-15

## 2023-11-16 RX ORDER — AMLODIPINE BESYLATE 2.5 MG/1
2.5 TABLET ORAL DAILY
Refills: 0 | Status: DISCONTINUED | OUTPATIENT
Start: 2023-11-16 | End: 2023-11-16

## 2023-11-16 RX ORDER — METOPROLOL TARTRATE 50 MG
1 TABLET ORAL
Qty: 30 | Refills: 0
Start: 2023-11-16 | End: 2023-12-15

## 2023-11-16 RX ADMIN — ENOXAPARIN SODIUM 80 MILLIGRAM(S): 100 INJECTION SUBCUTANEOUS at 06:14

## 2023-11-16 RX ADMIN — DORZOLAMIDE HYDROCHLORIDE TIMOLOL MALEATE 1 DROP(S): 20; 5 SOLUTION/ DROPS OPHTHALMIC at 06:13

## 2023-11-16 RX ADMIN — Medication 75 MICROGRAM(S): at 06:15

## 2023-11-16 RX ADMIN — Medication 50 MILLIGRAM(S): at 06:15

## 2023-11-16 RX ADMIN — TRAMADOL HYDROCHLORIDE 50 MILLIGRAM(S): 50 TABLET ORAL at 07:22

## 2023-11-16 RX ADMIN — TRAMADOL HYDROCHLORIDE 50 MILLIGRAM(S): 50 TABLET ORAL at 06:16

## 2023-11-16 RX ADMIN — TRAMADOL HYDROCHLORIDE 50 MILLIGRAM(S): 50 TABLET ORAL at 13:45

## 2023-11-16 RX ADMIN — PANTOPRAZOLE SODIUM 40 MILLIGRAM(S): 20 TABLET, DELAYED RELEASE ORAL at 06:15

## 2023-11-16 RX ADMIN — AMLODIPINE BESYLATE 2.5 MILLIGRAM(S): 2.5 TABLET ORAL at 13:00

## 2023-11-16 RX ADMIN — TRAMADOL HYDROCHLORIDE 50 MILLIGRAM(S): 50 TABLET ORAL at 13:00

## 2023-11-16 NOTE — DISCHARGE NOTE PROVIDER - NSDCCPCAREPLAN_GEN_ALL_CORE_FT
PRINCIPAL DISCHARGE DIAGNOSIS  Diagnosis: New onset atrial fibrillation  Assessment and Plan of Treatment: You were found to have a heart arrythmia called atrial fibrillation. Your arrythmia converted to a normal rhythm with a medication called metoprolol. Please continue this medication upon discharge. You need a blood thinner called Eliquis. Please continue 5mg every 12 hours. We recommend that you followup with your PCP or a cardiologist within 1 week after discharge.      SECONDARY DISCHARGE DIAGNOSES  Diagnosis: Hypertension  Assessment and Plan of Treatment: You were found with elevated blood pressures. Please take your blood pressure reading at home. If it constantly ranges over 140/100, please start amlodipine that has been prescribed to your pharmacy.    Diagnosis: TJ (acute kidney injury)  Assessment and Plan of Treatment: You were found with elevated creatinine levels indicating some decrease in renal function. This number stablizedand improved to 1.2. Your creatinine was 1.02 in January 2023 per Dr. Arthur Harmon's office. Please return to your PCP within one week of discharge to repeat labwork.

## 2023-11-16 NOTE — DISCHARGE NOTE PROVIDER - NSDCMRMEDTOKEN_GEN_ALL_CORE_FT
atorvastatin 10 mg oral tablet: 1 tab(s) orally once a day (at bedtime)  cycloSPORINE 0.05% ophthalmic emulsion: 1 drop(s) in each affected eye once a day  dorzolamide-timolol 2.23%-0.68% (2%-0.5% base) ophthalmic solution: 1 drop(s) in each eye 2 times a day  Eliquis 5 mg oral tablet: 1 tab(s) orally 2 times a day  levothyroxine 75 mcg (0.075 mg) oral tablet: 1 tab(s) orally once a day  traMADol 50 mg oral tablet: 1 tab(s) orally every 6 hours as needed for  moderate pain   amLODIPine 2.5 mg oral tablet: 1 tab(s) orally once a day  atorvastatin 10 mg oral tablet: 1 tab(s) orally once a day (at bedtime)  cycloSPORINE 0.05% ophthalmic emulsion: 1 drop(s) in each affected eye once a day  dorzolamide-timolol 2.23%-0.68% (2%-0.5% base) ophthalmic solution: 1 drop(s) in each eye 2 times a day  Eliquis 5 mg oral tablet: 1 tab(s) orally 2 times a day  levothyroxine 75 mcg (0.075 mg) oral tablet: 1 tab(s) orally once a day  metoprolol succinate 100 mg oral tablet, extended release: 1 tab(s) orally once a day  traMADol 50 mg oral tablet: 1 tab(s) orally every 6 hours as needed for  moderate pain

## 2023-11-16 NOTE — DISCHARGE NOTE NURSING/CASE MANAGEMENT/SOCIAL WORK - NSDCPEFALRISK_GEN_ALL_CORE
For information on Fall & Injury Prevention, visit: [email or web address] OR  [email or web address] OR  https://www.cdc.gov/steadi/patient.html

## 2023-11-16 NOTE — PHARMACOTHERAPY INTERVENTION NOTE - COMMENTS
Obtained free trial coupon for apixaban for patient. Attempted to give to patient's pharmacy x 3 calls.    To be attempted to give to outpatient pharmacy again in the afternoon, will give patient a copy of free trial card. Discussed with Dr. Jaramillo. 
Discussed new anticoagulant (apixaban) and rate-control medication for atrial fibrillation (metoprolol). Reviewed how to take and potential side effects. Answered all follow-up questions and left free trial coupon with patient to give to daron to obtain one-month free supply of apixaban.

## 2023-11-16 NOTE — PROGRESS NOTE ADULT - SUBJECTIVE AND OBJECTIVE BOX
C A R D I O L O G Y  **********************************     DATE OF SERVICE: 11-16-23    Patient denies chest pain or shortness of breath.   Review of systems otherwise (-)  	  MEDICATIONS:  MEDICATIONS  (STANDING):  amLODIPine   Tablet 2.5 milliGRAM(s) Oral daily  atorvastatin 10 milliGRAM(s) Oral at bedtime  dorzolamide 2%/timolol 0.5% Ophthalmic Solution 1 Drop(s) Both EYES two times a day  enoxaparin Injectable 80 milliGRAM(s) SubCutaneous every 12 hours  levothyroxine 75 MICROGram(s) Oral daily  metoprolol tartrate 50 milliGRAM(s) Oral two times a day  pantoprazole    Tablet 40 milliGRAM(s) Oral before breakfast      LABS:	 	    CARDIAC MARKERS:        Troponin I, High Sensitivity Result: 5.6 ng/L (11-14-23 @ 13:00)                              15.1   7.90  )-----------( 285      ( 16 Nov 2023 05:50 )             46.9     Hemoglobin: 15.1 g/dL (11-16 @ 05:50)  Hemoglobin: 15.8 g/dL (11-15 @ 05:46)  Hemoglobin: 16.1 g/dL (11-14 @ 13:00)      11-16    141  |  108  |  24<H>  ----------------------------<  117<H>  4.1   |  29  |  1.26    Ca    9.7      16 Nov 2023 05:50  Phos  4.0     11-16  Mg     2.4     11-16    TPro  7.3  /  Alb  3.7  /  TBili  0.7  /  DBili  x   /  AST  19  /  ALT  34  /  AlkPhos  89  11-14    Creatinine Trend: 1.26<--, 1.27<--, 1.38<--        PHYSICAL EXAM:  T(C): 37.1 (11-16-23 @ 11:10), Max: 37.1 (11-16-23 @ 11:10)  HR: 70 (11-16-23 @ 11:10) (62 - 127)  BP: 147/79 (11-16-23 @ 11:10) (113/53 - 159/98)  RR: 18 (11-16-23 @ 11:10) (17 - 18)  SpO2: 98% (11-16-23 @ 11:10) (96% - 100%)  Wt(kg): --  I&O's Summary    16 Nov 2023 07:01  -  16 Nov 2023 11:56  --------------------------------------------------------  IN: 200 mL / OUT: 0 mL / NET: 200 mL      HEENT:  (-)icterus (-)pallor  CV: N S1 S2 1/6 QUINTEN (+)2 Pulses B/l  Resp:  Clear to ausculatation B/L, normal effort  GI: (+) BS Soft, NT, ND  Lymph:  (-)Edema, (-)obvious lymphadenopathy  Skin: Warm to touch, Normal turgor  Psych: Appropriate mood and affect      TELEMETRY: 	  sinus        ASSESSMENT/PLAN: 	68y  Female PMHx of hypothyroidism, GERD (hiatal hernia), spinal stenosis, HLD, glaucoma and dry eye who was sent from the orthopedic office for tachycardia and A fib on EKG.    # Afib  - spontaneously converted to sinus  - can d/c on torpro XL 25 mg PO daily ]  - Transition to Novel AC which ever is covered   - Echo with normal LV fx  - f/u TSH    # SOB  - stress with no ischemia or infarct  - oupt cardiac f/u [phone]    Nathan Page MD, [facility]  BEEPER [phone]   C A R D I O L O G Y  **********************************     DATE OF SERVICE: 11-16-23    Patient denies chest pain or shortness of breath.   Review of systems otherwise (-)  	  MEDICATIONS:  MEDICATIONS  (STANDING):  amLODIPine   Tablet 2.5 milliGRAM(s) Oral daily  atorvastatin 10 milliGRAM(s) Oral at bedtime  dorzolamide 2%/timolol 0.5% Ophthalmic Solution 1 Drop(s) Both EYES two times a day  enoxaparin Injectable 80 milliGRAM(s) SubCutaneous every 12 hours  levothyroxine 75 MICROGram(s) Oral daily  metoprolol tartrate 50 milliGRAM(s) Oral two times a day  pantoprazole    Tablet 40 milliGRAM(s) Oral before breakfast      LABS:	 	    CARDIAC MARKERS:        Troponin I, High Sensitivity Result: 5.6 ng/L (11-14-23 @ 13:00)                              15.1   7.90  )-----------( 285      ( 16 Nov 2023 05:50 )             46.9     Hemoglobin: 15.1 g/dL (11-16 @ 05:50)  Hemoglobin: 15.8 g/dL (11-15 @ 05:46)  Hemoglobin: 16.1 g/dL (11-14 @ 13:00)      11-16    141  |  108  |  24<H>  ----------------------------<  117<H>  4.1   |  29  |  1.26    Ca    9.7      16 Nov 2023 05:50  Phos  4.0     11-16  Mg     2.4     11-16    TPro  7.3  /  Alb  3.7  /  TBili  0.7  /  DBili  x   /  AST  19  /  ALT  34  /  AlkPhos  89  11-14    Creatinine Trend: 1.26<--, 1.27<--, 1.38<--        PHYSICAL EXAM:  T(C): 37.1 (11-16-23 @ 11:10), Max: 37.1 (11-16-23 @ 11:10)  HR: 70 (11-16-23 @ 11:10) (62 - 127)  BP: 147/79 (11-16-23 @ 11:10) (113/53 - 159/98)  RR: 18 (11-16-23 @ 11:10) (17 - 18)  SpO2: 98% (11-16-23 @ 11:10) (96% - 100%)  Wt(kg): --  I&O's Summary    16 Nov 2023 07:01  -  16 Nov 2023 11:56  --------------------------------------------------------  IN: 200 mL / OUT: 0 mL / NET: 200 mL      HEENT:  (-)icterus (-)pallor  CV: N S1 S2 1/6 QUINTEN (+)2 Pulses B/l  Resp:  Clear to ausculatation B/L, normal effort  GI: (+) BS Soft, NT, ND  Lymph:  (-)Edema, (-)obvious lymphadenopathy  Skin: Warm to touch, Normal turgor  Psych: Appropriate mood and affect      TELEMETRY: 	  sinus        ASSESSMENT/PLAN: 	68y  Female PMHx of hypothyroidism, GERD (hiatal hernia), spinal stenosis, HLD, glaucoma and dry eye who was sent from the orthopedic office for tachycardia and A fib on EKG.    # Afib  - spontaneously converted to sinus  - can d/c on torpro XL 25 mg PO daily ]  - Transition to Novel AC which ever is covered   - Echo with normal LV fx  - TSH wnl    # SOB  - stress with no ischemia or infarct  - oupt cardiac f/u [phone]    Nathan Page MD, [facility]  BEEPER [phone]   C A R D I O L O G Y  **********************************     DATE OF SERVICE: 11-16-23    Patient denies chest pain or shortness of breath.   Review of systems otherwise (-)  	  MEDICATIONS:  MEDICATIONS  (STANDING):  amLODIPine   Tablet 2.5 milliGRAM(s) Oral daily  atorvastatin 10 milliGRAM(s) Oral at bedtime  dorzolamide 2%/timolol 0.5% Ophthalmic Solution 1 Drop(s) Both EYES two times a day  enoxaparin Injectable 80 milliGRAM(s) SubCutaneous every 12 hours  levothyroxine 75 MICROGram(s) Oral daily  metoprolol tartrate 50 milliGRAM(s) Oral two times a day  pantoprazole    Tablet 40 milliGRAM(s) Oral before breakfast      LABS:	 	    CARDIAC MARKERS:        Troponin I, High Sensitivity Result: 5.6 ng/L (11-14-23 @ 13:00)                              15.1   7.90  )-----------( 285      ( 16 Nov 2023 05:50 )             46.9     Hemoglobin: 15.1 g/dL (11-16 @ 05:50)  Hemoglobin: 15.8 g/dL (11-15 @ 05:46)  Hemoglobin: 16.1 g/dL (11-14 @ 13:00)      11-16    141  |  108  |  24<H>  ----------------------------<  117<H>  4.1   |  29  |  1.26    Ca    9.7      16 Nov 2023 05:50  Phos  4.0     11-16  Mg     2.4     11-16    TPro  7.3  /  Alb  3.7  /  TBili  0.7  /  DBili  x   /  AST  19  /  ALT  34  /  AlkPhos  89  11-14    Creatinine Trend: 1.26<--, 1.27<--, 1.38<--        PHYSICAL EXAM:  T(C): 37.1 (11-16-23 @ 11:10), Max: 37.1 (11-16-23 @ 11:10)  HR: 70 (11-16-23 @ 11:10) (62 - 127)  BP: 147/79 (11-16-23 @ 11:10) (113/53 - 159/98)  RR: 18 (11-16-23 @ 11:10) (17 - 18)  SpO2: 98% (11-16-23 @ 11:10) (96% - 100%)  Wt(kg): --  I&O's Summary    16 Nov 2023 07:01  -  16 Nov 2023 11:56  --------------------------------------------------------  IN: 200 mL / OUT: 0 mL / NET: 200 mL      HEENT:  (-)icterus (-)pallor  CV: N S1 S2 1/6 QUINTEN (+)2 Pulses B/l  Resp:  Clear to ausculatation B/L, normal effort  GI: (+) BS Soft, NT, ND  Lymph:  (-)Edema, (-)obvious lymphadenopathy  Skin: Warm to touch, Normal turgor  Psych: Appropriate mood and affect      TELEMETRY: 	  sinus        ASSESSMENT/PLAN: 	68y  Female PMHx of hypothyroidism, GERD (hiatal hernia), spinal stenosis, HLD, glaucoma and dry eye who was sent from the orthopedic office for tachycardia and A fib on EKG.    # Afib  - spontaneously converted to sinus  - can d/c on torpro XL 25 mg PO daily ]  - Transition to Novel AC which ever is covered   - Echo with normal LV fx  - TSH wnl    # SOB  - stress with no ischemia or infarct  - oupt cardiac f/u [phone].  Appt set for 12/7 at 1:30pm with Dr. Benavides at [facility]    Nathan Page MD, [facility]  BEEPER [phone]

## 2023-11-16 NOTE — PROGRESS NOTE ADULT - ASSESSMENT
69 yo female who presented with new onset atrial fibrillation, now having completed workup including stress and echo, is medically stable for discharge    Reviewed telemetry readings since admission, reviewed am labs inclubing cbc and cmp. Discussed with cardiology regarding outpatient follow up and plan for discharge        1) Atrial Fibrillation with RVR  Workupnegative thus far  dc on 100mg metoprolol ER  - DC on DOAC eliquis  - outpatient cardio followup  - For discharge today    2) Chronic Lower Back Pain  - Patient informed to immediately discontinue celecoxib- new cardiac issue and tj  - acetaminophen and tramadol as needed  - Recommended to initiate gabapentin- patient somewhat anxious, read bad things online regarding  --She can start inpatient and be monitored.    3) Acute Kidney Injury  - Creatinine 1/2023 was 1.02  - Likely has TJ superimposed on ckd  - renal function improved. Continue outpatient monitoring of renal function    4) Erythrocytosis  - 1L fluids given, recheck CBC in AM,   - Check EPO primary vs secondary, [id number] was also sent however is not indicated    5) HLD  - recently started statin, continue    6) hypothyroidism  - Continue thyroid replacement therapy    7) GERD  - ppi therapy  
Patient is a 68F with PMHx of hypothyroidism, GERD (hiatal hernia), spinal stenosis, HLD, glaucoma and dry eye who was sent from the orthopedic office for tachycardia and A fib on EKG. Patient admitted for new onset atrial flutter.

## 2023-11-16 NOTE — DISCHARGE NOTE NURSING/CASE MANAGEMENT/SOCIAL WORK - PATIENT PORTAL LINK FT
You can access the FollowMyHealth Patient Portal offered by [facility] by registering at the following website: [email or web address]. By joining [employer]’s FollowMyHealth portal, you will also be able to view your health information using other applications (apps) compatible with our system.

## 2023-11-16 NOTE — PROGRESS NOTE ADULT - SUBJECTIVE AND OBJECTIVE BOX
INTERVAL HPI/OVERNIGHT EVENTS:   Seen and examined in the AM. patient refused MAKENZIE with cardioversion, however went into sinus rhythm per telemetry. Symptoms improving.    REVIEW OF SYSTEMS:  negative except per hpi    Vital Signs Last 24 Hrs  T(C): 37.1 (16 Nov 2023 11:10), Max: 37.1 (16 Nov 2023 11:10)  T(F): 98.7 (16 Nov 2023 11:10), Max: 98.7 (16 Nov 2023 11:10)  HR: 70 (16 Nov 2023 11:10) (62 - 127)  BP: 147/79 (16 Nov 2023 11:10) (113/53 - 159/98)  BP(mean): --  RR: 18 (16 Nov 2023 11:10) (17 - 18)  SpO2: 98% (16 Nov 2023 11:10) (96% - 100%)    Parameters below as of 16 Nov 2023 11:10  Patient On (Oxygen Delivery Method): room air        PHYSICAL EXAMINATION:  NAD, pleasant, heart regular rate and rhythm, lungs clear, abd soft nontender no lower extremity edema. Ambulated around the unit 2x with improvement in symptoms from yesterday.                          15.1   7.90  )-----------( 285      ( 16 Nov 2023 05:50 )             46.9     11-16    141  |  108  |  24<H>  ----------------------------<  117<H>  4.1   |  29  |  1.26    Ca    9.7      16 Nov 2023 05:50  Phos  4.0     11-16  Mg     2.4     11-16                CAPILLARY BLOOD GLUCOSE      RADIOLOGY & ADDITIONAL TESTS:

## 2023-11-16 NOTE — DISCHARGE NOTE PROVIDER - HOSPITAL COURSE
Patient is a 68F with PMHx of hypothyroidism, GERD (hiatal hernia), spinal stenosis, HLD, glaucoma and dry eye who was sent from the orthopedic office for tachycardia and A fib on EKG. Per the patient, she has been having mild dyspnea on exertion and palpitation the past week, which she thought was due to GERD. She increased her nexium intake, which alleviated some of the symptoms. She went into the orthopedic office for transforaminal epidural steroid injection and was found to be tachycardia and AFib on EKG. Patient has not skipped or changed synthyoid regimen and takes them as prescribed. Denies any sick contacts or recent stressors. Currently, patient denies SOB at rest, dizziness, lightheadedness, fatigue, chest pain, N/V, palpitations, cough.     In ED   T 98.2  , /88  RR 19, O2 97 in RA  EKG: A flutter with AV block      Problem/Plan - 1:  ·  Problem: New onset atrial fibrillation.  ·  Plan: In ED EKG: A flutter with variable AV block, new onset   telemetry consistent with Afib not Aflutter  s/p lopressor 5mg IVP   increase lopressor from 25 to 50mg BID  c/w Lovenox 80mg BID  CHADSVASC=2 (possibly 3 if undiagnosed HTN)  c/w telemetry  Consulted Cardio/EP (Kaylee/Ace)  f/u stress test  f/u echo  rate control vs. cardioversion vs cath pending results.     Problem/Plan - 2:  ·  Problem: Hypothyroidism.   ·  Plan: c/w home med synthroid 75mcg  TSH 0.97.     Problem/Plan - 3:  ·  Problem: Chronic GERD.   ·  Plan: hiatal hernia on esomeprazole at home  PPI PO 40mg qd.     Problem/Plan - 4:  ·  Problem: Glaucoma, bilateral.   ·  Plan: c/w dorzolemide-timolol eye drop BID  on cyclosporine eye 0.05% qd for dry eye (pharmacy doesn't carry).     Problem/Plan - 5:  ·  Problem: Spinal stenosis.   ·  Plan: on tramadol 50mg q6h PRN   c/w home tramadol.     Problem/Plan - 6:  ·  Problem: HLD (hyperlipidemia).   ·  Plan: c/w home med atorvastatin 10mg qd.   Patient is a 68F with PMHx of hypothyroidism, GERD (hiatal hernia), spinal stenosis, HLD, glaucoma and dry eye who was sent from the orthopedic office for tachycardia and A fib on EKG. Per the patient, she has been having mild dyspnea on exertion and palpitation the past week, which she thought was due to GERD. She increased her nexium intake, which alleviated some of the symptoms. She went into the orthopedic office for transforaminal epidural steroid injection and was found to be tachycardia and AFib on EKG. Patient has not skipped or changed synthyoid regimen and takes them as prescribed. Denies any sick contacts or recent stressors. Currently, patient denies SOB at rest, dizziness, lightheadedness, fatigue, chest pain, N/V, palpitations, cough.   Admitted for afib with rvr. Started on metoprolol with eventual conversion to sinus rhythm. Echo and stress performed, normal EF negativeischemic study.     Currently in sinus rhythm, ambulating well with mild symptoms, medically stable for discharge    Patient is stable for discharge per attending and is advised to follow up with PCP as outpatient  Please refer to patient's complete medical chart with documents for a full hospital course, for this is only a brief summary.

## 2023-11-16 NOTE — CHART NOTE - NSCHARTNOTEFT_GEN_A_CORE
Called patient's PCP, Arthur Harmon yesterday 11.16.2023. I left my personal number for call back for physician. I obtained baseline lab values including hgb ~15 and creatinine 1/2023 of 1.02

## 2023-11-17 LAB
EPO SERPL-MCNC: 8 MIU/ML — SIGNIFICANT CHANGE UP (ref 2.6–18.5)
EPO SERPL-MCNC: 8 MIU/ML — SIGNIFICANT CHANGE UP (ref 2.6–18.5)

## 2023-11-21 ENCOUNTER — APPOINTMENT (OUTPATIENT)
Dept: PAIN MANAGEMENT | Facility: CLINIC | Age: 68
End: 2023-11-21

## 2023-11-21 ENCOUNTER — APPOINTMENT (OUTPATIENT)
Dept: PAIN MANAGEMENT | Facility: CLINIC | Age: 68
End: 2023-11-21
Payer: MEDICARE

## 2023-11-21 VITALS — HEIGHT: 63 IN | BODY MASS INDEX: 29.23 KG/M2 | WEIGHT: 165 LBS

## 2023-11-21 PROBLEM — M48.00 SPINAL STENOSIS, SITE UNSPECIFIED: Chronic | Status: ACTIVE | Noted: 2023-11-14

## 2023-11-21 PROBLEM — K21.9 GASTRO-ESOPHAGEAL REFLUX DISEASE WITHOUT ESOPHAGITIS: Chronic | Status: ACTIVE | Noted: 2023-11-14

## 2023-11-21 PROBLEM — E03.9 HYPOTHYROIDISM, UNSPECIFIED: Chronic | Status: ACTIVE | Noted: 2023-11-14

## 2023-11-21 PROBLEM — H40.9 UNSPECIFIED GLAUCOMA: Chronic | Status: ACTIVE | Noted: 2023-11-14

## 2023-11-21 PROBLEM — E78.5 HYPERLIPIDEMIA, UNSPECIFIED: Chronic | Status: ACTIVE | Noted: 2023-11-14

## 2023-11-21 LAB
JAK2 P.V617F BLD/T QL: SIGNIFICANT CHANGE UP
JAK2 P.V617F BLD/T QL: SIGNIFICANT CHANGE UP

## 2023-11-21 PROCEDURE — 99214 OFFICE O/P EST MOD 30 MIN: CPT

## 2023-12-19 ENCOUNTER — APPOINTMENT (OUTPATIENT)
Dept: PAIN MANAGEMENT | Facility: CLINIC | Age: 68
End: 2023-12-19
Payer: MEDICARE

## 2023-12-19 PROCEDURE — 99214 OFFICE O/P EST MOD 30 MIN: CPT

## 2023-12-19 NOTE — PHYSICAL EXAM
[de-identified] : Gen: NAD Gait: antalgic Psych:  Mood appropriate for given condition ROM: limited AROM of the L spine in all planes, full ROM at ankles, knees and hips   Sensation: decreased to lt touch over R leg, otherwise intact to light touch in all dermatomes tested from L2-S1 bilaterally Reflexes: 2+ b/l patella and Achilles Palpation: Diffusely tender over lumbar paraspinals  -TTP over the b/l greater trochanters and +TTP R SI joint Provacative tests: +SLR, and seated root (slump test) on the R, neg Gay, +RAISA testing R, +R gaenslen, +R SI compression, FADIR testing  				Right	Left L2/3	Hip flexion		 5	 5 L3/4	Knee Extension		 5	 5 L4/5	Ankle Dorsiflexion 	 5	 5 L5/S1	Great toe extension	 5	 5 L4/5	Inversion		 5	 5 L5/S1	Eversion		 5	 5 L5/S1	Hip Abudction		 3	 3 S1/2	Hip Extension		 3	 3 S1/2	Hip Fexion		 5	 5

## 2023-12-19 NOTE — DISCUSSION/SUMMARY
[de-identified] : After discussing various treatment options with the patient including but not limited to oral medications, physical therapy, exercise, modalities as well as interventional spinal injections, we have decided with the following plan:   - pharmacological:    - refill tramadol 50mg TID prn, #90tabs for 30 day supply   - encouraged to start gabapentin 300mg TID   - dc celebrex 200mg daily prn due to eliquis   - naloxone made available   - urine drug screen 10/17/23 without inconsistencies - Imaging: I personally reviewed the radiological images and agree with the radiologist's report. The radiological findings were discussed with the patient. - Interventional: consider repeat Right L4-5, L5-S1 TFESI, but pt will see cardiology prior to holding eliquis - rehabilitative: c/w PT    - follow up 1 month   - Patient evaluated for risk of misuse of opiates by using Opioid Risk Assessment Tool - Patient is taking opiates for longer than 6 weeks - Signed opioid agreement in chart - patient is on the lowest dose of opioid possible with analgesia, without notable side effects or any obvious aberrant behaviors exhibited, that improves ADL and quality of life - there is clinically meaningful improvement in pain and function that outweighs risks to patient safety. I have discussed risks and realistic benefits of opioid therapy, and patient and clinician responsibilities for managing therapy.  - I reminded the patient that pain medications may impair judgement and function, and that they should not drive, operate machinery, or make important decisions while impaired - The patient was provided Fall Risk counseling due to the prescribed medication(s). The patient is aware of the risks associated with their medication(s) - I have consulted the  Registry for the purpose of reviewing the patient's controlled substance - Continuing conservative care including home exercises, stretching, activity modification, physical therapy was encouraged  Bo Marcos MD

## 2023-12-19 NOTE — HISTORY OF PRESENT ILLNESS
[Lower back] : lower back [Left Leg] : left leg [Right Leg] : right leg [7] : 7 [Dull/Aching] : dull/aching [Radiating] : radiating [Sharp] : sharp [Shooting] : shooting [Stabbing] : stabbing [Intermittent] : intermittent [Household chores] : household chores [Leisure] : leisure [Social interactions] : social interactions [Injection therapy] : injection therapy [Standing] : standing [Walking] : walking [FreeTextEntry1] : Interval HPI 12/19/23  Pt returns for medication refill. Pain is similar as prior visits. Pt continues the present medication regimen, which helps to improve quality of life and ADLs, and reports no adverse effects.  Interval HPI 11/21/2023 Pt returns for follow up. We cancelled BARBARA due to new onset Afib. She is now on eliquis. She continues PT, which is helping her pain. Her lumbar radicular pain is similar as prior visits. Pt continues the present medication regimen, which helps to improve quality of life and ADLs, and reports no adverse effects.  Initial HPI 10/17/2023 Pt returns for medication refill. Pain is similar as prior visits. Pt continues the present medication regimen, which helps to improve quality of life and ADLs, and reports no adverse effects.  Her lumbar radicular pain has returned, primarily radiating down the RLE. Pain is rated 8/10, electric like, sharp, shotting, worse with sitting, alleviated with laying down. She has had R L4-5, L5-S1 TFESI on 1/24/23 which provided >50% relief for >4 months. She would like a repeat.   Interval HPI 09/19/2023  Pt returns for medication refill. Pain is similar as prior visits. Pt continues the present medication regimen, which helps to improve quality of life and ADLs, and reports no adverse effects. Physical therapy is helping to alleviate her pain.   Interval HPI 08/15/2023 Pt returns for medication refill. She continues PT, which helps to improve her pain. Pt continues tramadol and celebrex, which helps to alleviate her pain. She decided not to take gabapentin due to concern for side effects. She reports primarily R buttock pain, aching, throbbing, rated 7/10, aching, throbbing, worse with transitions from sitting to standing. Patient denies loss of bowel/bladder function, new motor deficits, fevers, or chills. There is associated numbness/tingling.  Initial HPI:06/01/2023 Ms. STEEN is a 67 year old F who presents for initial evaluation for low back and leg pain. Pain started 8 weeks ago and pain rated 7/10 or higher. It is not associated with any inciting injury. Pain radiates to down the L/R leg. Pain is described as shooting and electric shock when radiating.  Pain is worsened with sitting, coughing and bearing down. Patient has failed conservative treatment with acetaminophen, NSAIDs, muscle relaxants, and physical therapy/HEP. Patient denies loss of bowel/bladder function, new motor deficits, fevers, or chills. There is associated numbness/tingling. She recently received b/l L4-5 TFESI on 5/9/23 with 50% ongoing relief. Pain was affecting quality of life and ADLs, but has since improved after receiving TFESI. She takes tramadol 50mg TID #90 tabs for 30 day supply, 2 refills, without adverse effect.   Images Reviewed:  MRI L-spine 5/9/23  Istop Reviewed:  [id number]  Pain Tx Hx:  R L4-5, L5-S1 TFESI 1/24/23 - 80% relief B/L L4-5 TFESI 05/09/23 - 50% relief     [] : no [de-identified] : 05/09/23 [de-identified] : winston  [de-identified] : mri

## 2024-01-18 ENCOUNTER — APPOINTMENT (OUTPATIENT)
Dept: PAIN MANAGEMENT | Facility: CLINIC | Age: 69
End: 2024-01-18

## 2024-01-25 ENCOUNTER — APPOINTMENT (OUTPATIENT)
Dept: PAIN MANAGEMENT | Facility: CLINIC | Age: 69
End: 2024-01-25
Payer: MEDICARE

## 2024-01-25 VITALS — WEIGHT: 165 LBS | BODY MASS INDEX: 29.23 KG/M2 | HEIGHT: 63 IN

## 2024-01-25 PROCEDURE — 99214 OFFICE O/P EST MOD 30 MIN: CPT

## 2024-01-25 RX ORDER — GABAPENTIN 300 MG/1
300 CAPSULE ORAL 3 TIMES DAILY
Qty: 90 | Refills: 0 | Status: DISCONTINUED | COMMUNITY
Start: 2023-06-01 | End: 2024-01-25

## 2024-01-25 RX ORDER — CELECOXIB 200 MG/1
200 CAPSULE ORAL TWICE DAILY
Qty: 60 | Refills: 3 | Status: DISCONTINUED | COMMUNITY
Start: 2023-08-15 | End: 2024-01-25

## 2024-01-25 RX ORDER — CELECOXIB 200 MG/1
200 CAPSULE ORAL DAILY
Refills: 0 | Status: DISCONTINUED | COMMUNITY
End: 2024-01-25

## 2024-02-29 ENCOUNTER — APPOINTMENT (OUTPATIENT)
Dept: PAIN MANAGEMENT | Facility: CLINIC | Age: 69
End: 2024-02-29
Payer: MEDICARE

## 2024-02-29 VITALS — BODY MASS INDEX: 29.23 KG/M2 | HEIGHT: 63 IN | WEIGHT: 165 LBS

## 2024-02-29 PROCEDURE — 99214 OFFICE O/P EST MOD 30 MIN: CPT

## 2024-02-29 RX ORDER — NALOXONE HYDROCHLORIDE 4 MG/.1ML
4 SPRAY NASAL
Qty: 1 | Refills: 0 | Status: ACTIVE | COMMUNITY
Start: 2023-08-15 | End: 1900-01-01

## 2024-02-29 NOTE — DATA REVIEWED
[FreeTextEntry1] : 5/10/2019 - [facility] (lumbar): FINDINGS: No prior exams are available for comparison. The lumbar spinal canal is normal in size and configuration. No acute fracture is seen. No intradural abnormalities, bony destructive lesions or paraspinal masses are noted. The distal thoracic spinal cord and conus are normal. No bony or neural anomalies are identified.   A moderate levoscoliosis of the lumbar spine is seen. Disc degeneration is noted involving all lumbar levels, with associated chronic endplate deformity and anterior bony productive change. Sagittal imaging demonstrates a small central and right-sided disc herniation at the T11-12 level, with mild reverse spondylolisthesis. There is mild midline and right-sided thecal sac compression.   L1-2: Moderate asymmetric right-sided disc narrowing is noted, with mild right lateral subluxation of L1. Mild right posterolateral osteophyte formation is demonstrated, with mild to moderate bilateral facet arthropathy. There is mild right anterior thecal sac deformity. L2-3: Moderate asymmetric right-sided disc narrowing is noted, with mild posterior osteophyte formation, moderate bilateral facet arthropathy and abundant posterior epidural fat. There is mild to moderate thecal sac deformity. L3-4: Mild posterior bony productive change is noted, with mild to moderate bilateral facet arthropathy. There is very mild thecal sac deformity. L4-5: Grade I spondylolisthesis is noted, with moderate asymmetric left-sided disc narrowing. A small central and slightly more left-sided broad-based disc herniation is demonstrated, with marked bilateral facet arthropathy and prominent inferior ligamentum flavum hypertrophy. These findings are contributing to marked long segment spinal stenosis, with slightly greater left recess and foraminal narrowing. L5-S1: Mild posterior bony productive change is noted, with moderate bilateral facet arthropathy. There is very mild thecal sac deformity.

## 2024-02-29 NOTE — PHYSICAL EXAM
[de-identified] : Gen: NAD Head: NC/AT Eyes: no glasses, no scleral icterus ENT: mucous membranes moist CV: no JVD Lungs: CTAB, nonlabored breathing Abd: soft, NT/ND Ext: full ROM in all extremities, no peripheral edema Back: limited extension 2/2 pain, +TTP in the bilateral low lumbar facet region Neuro: CN intact LEs +5 L +5 R hip flexion +5 L +5 R leg extension +5 L +5 R leg flexion +5 L +5 R foot dorsiflexion +5 L +5 R foot plantarflexion +5 L +5 R EHL extension Psych: normal affect Skin: no visible lesions

## 2024-02-29 NOTE — DISCUSSION/SUMMARY
[de-identified] : VICKI STEEN is a 68 year-old woman presenting for a RPV for a history of chronic low back pain with radicular features.  Prior treatment: 5/9/2023: Bilateral L4-L5 TFESI 50% relief 1/24/2023: RIGHT L4-L5, L5-S1 TFESI 80% relief  Discussed the risks and benefits of chronic opioid therapy. OK to continue as long as the medication allows the patient to have significant pain reduction and improved functional status and does not exhibit evidence of addiction or ADRB. The patient expresses having significant benefit from the prescribed opioids in pain reduction and functional improvement. [place] [employer] PD[place] was checked and appropriate. Last prescription filled was on 1/28/2024 for tramadol 50mg #90 tabs Current regimen: tramadol 50mg TID MED = 15 Opioid agreement date - 1/25/2024 UDS - 10/17/2023 positive for tramadol Last naloxone prescription - 2/29/2024 ORT score - 0 (low risk) Risk factors for respiratory depression - none ADRB - none  Plan: 1) [place] PDMP was checked and appropriate.  2) Discussed opioid therapy and the risks of respiratory depression. Discussed the importance of having naloxone in the home and how to properly utilize an intranasal dose in case of overdose. Discussed with the patient that they should educate those close to them regarding the proper use of emergency naloxone. New script for naloxone sent. 3) Refill tramadol 50mg TID prn #90 tabs 4) UDS - expect tramadol only 5) MRI lumbar spine images reviewed with the patient. 6) Will defer interventions at this time due to recently started ATC for Afib--patient will follow up with her cardiologist to see when she may be able to pause her ATC for an BARBARA 7) Trial gabapentin 300mg with increase to TID; Discussed AEs, including sedation, dizziness, somnolence, depression. Patient told to use caution when driving or working after taking the first few doses.  8) Continue physical therapy - new script provided 9) RTC 4 weeks

## 2024-02-29 NOTE — HISTORY OF PRESENT ILLNESS
[Lower back] : lower back [Left Leg] : left leg [Right Leg] : right leg [8] : 8 [7] : 7 [Radiating] : radiating [Sharp] : sharp [Stabbing] : stabbing [Household chores] : household chores [Intermittent] : intermittent [Leisure] : leisure [Sleep] : sleep [Meds] : meds [Physical therapy] : physical therapy [Standing] : standing [Walking] : walking [Stairs] : stairs [Part time] : Work status: part time [FreeTextEntry1] : 2/29/2024: VICKI STEEN is a 68 year-old woman presenting for a RPV for a history of chronic low back pain with radicular features. She continues to have pain in the low back with radiation to the right anterolateral thigh and anterior leg. She notes symmetric symptoms in the left lower extremity, but states that this is less frequent. No focal numbness or weakness in the lower extremities. No bowel or bladder incontinence or saddle anesthesia. She was prescribed gabapentin at last visit, but did not start this due to concern about potential side effects. The patient continues to take tramadol 50mg TID prn, which is helpful for her pain and function. She notes occasional dry mouth but otherwise denies side effects, including sedation or constipation.  The patient states that average pain over the past week was 7/10 in severity. Mood: No depression or anxiety.  Sleep: No difficulty with sleep initiation or maintenance 2/2 pain.   1/25/2024: VICKI STEEN is a 68 year-old woman presenting for a NPV (Dr. Marcos) for a history of chronic low back pain with radicular features. She was scheduled for an BARBARA in 11/2023 but was found to have Afib in the preoperative period. She was started on ATC. She also stopped celecoxib, which had previously been very helpful for her pain. She continues to take tramadol prn as well as acetaminophen. At this point, she describes an aching pain across the low lumbar region with radiation to the right anterolateral thigh. She notes intermittent numbness and weakness in the RLE.  The patient states that average pain over the past week was 6/10 in severity.  Dr. Marcos: Interval HPI 12/19/23 Pt returns for medication refill. Pain is similar as prior visits. Pt continues the present medication regimen, which helps to improve quality of life and ADLs, and reports no adverse effects.  Interval HPI 11/21/2023 Pt returns for follow up. We cancelled BARBARA due to new onset Afib. She is now on eliquis. She continues PT, which is helping her pain. Her lumbar radicular pain is similar as prior visits. Pt continues the present medication regimen, which helps to improve quality of life and ADLs, and reports no adverse effects.  Initial HPI 10/17/2023 Pt returns for medication refill. Pain is similar as prior visits. Pt continues the present medication regimen, which helps to improve quality of life and ADLs, and reports no adverse effects.  Her lumbar radicular pain has returned, primarily radiating down the RLE. Pain is rated 8/10, electric like, sharp, shotting, worse with sitting, alleviated with laying down. She has had R L4-5, L5-S1 TFESI on 1/24/23 which provided >50% relief for >4 months. She would like a repeat.  Interval HPI 09/19/2023 Pt returns for medication refill. Pain is similar as prior visits. Pt continues the present medication regimen, which helps to improve quality of life and ADLs, and reports no adverse effects. Physical therapy is helping to alleviate her pain.  Interval HPI 08/15/2023 Pt returns for medication refill. She continues PT, which helps to improve her pain. Pt continues tramadol and celebrex, which helps to alleviate her pain. She decided not to take gabapentin due to concern for side effects. She reports primarily R buttock pain, aching, throbbing, rated 7/10, aching, throbbing, worse with transitions from sitting to standing. Patient denies loss of bowel/bladder function, new motor deficits, fevers, or chills. There is associated numbness/tingling.  Initial HPI:06/01/2023 Ms. STEEN is a 67 year old F who presents for initial evaluation for low back and leg pain. Pain started 8 weeks ago and pain rated 7/10 or higher. It is not associated with any inciting injury. Pain radiates to down the L/R leg. Pain is described as shooting and electric shock when radiating. Pain is worsened with sitting, coughing and bearing down. Patient has failed conservative treatment with acetaminophen, NSAIDs, muscle relaxants, and physical therapy/HEP. Patient denies loss of bowel/bladder function, new motor deficits, fevers, or chills. There is associated numbness/tingling. She recently received b/l L4-5 TFESI on 5/9/23 with 50% ongoing relief. Pain was affecting quality of life and ADLs, but has since improved after receiving TFESI. She takes tramadol 50mg TID #90 tabs for 30 day supply, 2 refills, without adverse effect. [] : no [FreeTextEntry7] : right buttock [de-identified] : 05/09/2023
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
## 2024-03-27 PROBLEM — M25.551 HIP PAIN, RIGHT: Status: ACTIVE | Noted: 2023-01-10

## 2024-03-28 ENCOUNTER — APPOINTMENT (OUTPATIENT)
Dept: PAIN MANAGEMENT | Facility: CLINIC | Age: 69
End: 2024-03-28
Payer: MEDICARE

## 2024-03-28 VITALS — HEIGHT: 63 IN | BODY MASS INDEX: 31.36 KG/M2 | WEIGHT: 177 LBS

## 2024-03-28 DIAGNOSIS — M25.551 PAIN IN RIGHT HIP: ICD-10-CM

## 2024-03-28 PROCEDURE — 99214 OFFICE O/P EST MOD 30 MIN: CPT

## 2024-03-28 RX ORDER — GABAPENTIN 300 MG/1
300 CAPSULE ORAL
Qty: 90 | Refills: 2 | Status: DISCONTINUED | COMMUNITY
Start: 2024-01-25 | End: 2024-03-28

## 2024-03-28 RX ORDER — GABAPENTIN 600 MG/1
600 TABLET, COATED ORAL
Qty: 90 | Refills: 2 | Status: ACTIVE | COMMUNITY
Start: 2024-03-28 | End: 1900-01-01

## 2024-03-28 NOTE — PHYSICAL EXAM
[de-identified] : Gen: NAD Head: NC/AT Eyes: no glasses, no scleral icterus ENT: mucous membranes moist CV: no JVD Lungs: CTAB, nonlabored breathing Abd: soft, NT/ND Ext: full ROM in all extremities, no peripheral edema Back: limited extension 2/2 pain, +TTP in the bilateral SI joint region, +RAISA/yeomnan's/gaenslen's bilaterally Neuro: CN intact LEs +5 L +5 R hip flexion +5 L +5 R leg extension +5 L +5 R leg flexion +5 L +5 R foot dorsiflexion +5 L +5 R foot plantarflexion +5 L +5 R EHL extension Psych: normal affect Skin: no visible lesions

## 2024-03-28 NOTE — HISTORY OF PRESENT ILLNESS
[Lower back] : lower back [Left Leg] : left leg [Right Leg] : right leg [8] : 8 [Radiating] : radiating [Sharp] : sharp [Stabbing] : stabbing [Intermittent] : intermittent [Household chores] : household chores [Leisure] : leisure [Sleep] : sleep [Meds] : meds [Physical therapy] : physical therapy [Standing] : standing [Walking] : walking [Stairs] : stairs [Part time] : Work status: part time [Buttock] : buttock [7] : 7 [5] : 5 [Dull/Aching] : dull/aching [Throbbing] : throbbing [Work] : work [Sitting] : sitting [FreeTextEntry1] : 3/28/2024: VICKI STEEN is a 68 year-old woman presenting for a RPV for a history of chronic low back pain with radicular features. The patient was started on gabapentin at her last visit. She has been able to increase the medication to three times per day. She feels that this has not helped significantly with her pain but denies any side effects, including sedation or dizziness. The patient continues to have an aching pain across the low lumbar region with radiation to the right anterior thigh. She notes intermittent numbness over this distribution. No focal weakness, bowel or bladder incontinence or saddle anesthesia. The patient continues to take tramadol 50mg TID which continues to help with her pain and function. No side effects, including depression or anxiety.  The patient states that average pain over the past week was 7/10 in severity. Mood: Patient denies depression or anxiety.  Sleep: Patient denies having difficulty with sleep 2/2 pain.   2/29/2024: VICKI STEEN is a 68 year-old woman presenting for a RPV for a history of chronic low back pain with radicular features. She continues to have pain in the low back with radiation to the right anterolateral thigh and anterior leg. She notes symmetric symptoms in the left lower extremity, but states that this is less frequent. No focal numbness or weakness in the lower extremities. No bowel or bladder incontinence or saddle anesthesia. She was prescribed gabapentin at last visit, but did not start this due to concern about potential side effects. The patient continues to take tramadol 50mg TID prn, which is helpful for her pain and function. She notes occasional dry mouth but otherwise denies side effects, including sedation or constipation.  The patient states that average pain over the past week was 7/10 in severity. Mood: No depression or anxiety.  Sleep: No difficulty with sleep initiation or maintenance 2/2 pain.   1/25/2024: VICKI STEEN is a 68 year-old woman presenting for a NPV (Dr. Marcos) for a history of chronic low back pain with radicular features. She was scheduled for an BARBARA in 11/2023 but was found to have Afib in the preoperative period. She was started on ATC. She also stopped celecoxib, which had previously been very helpful for her pain. She continues to take tramadol prn as well as acetaminophen. At this point, she describes an aching pain across the low lumbar region with radiation to the right anterolateral thigh. She notes intermittent numbness and weakness in the RLE.  The patient states that average pain over the past week was 6/10 in severity.  Dr. Marcos: Interval HPI 12/19/23 Pt returns for medication refill. Pain is similar as prior visits. Pt continues the present medication regimen, which helps to improve quality of life and ADLs, and reports no adverse effects.  Interval HPI 11/21/2023 Pt returns for follow up. We cancelled BARBARA due to new onset Afib. She is now on eliquis. She continues PT, which is helping her pain. Her lumbar radicular pain is similar as prior visits. Pt continues the present medication regimen, which helps to improve quality of life and ADLs, and reports no adverse effects.  Initial HPI 10/17/2023 Pt returns for medication refill. Pain is similar as prior visits. Pt continues the present medication regimen, which helps to improve quality of life and ADLs, and reports no adverse effects.  Her lumbar radicular pain has returned, primarily radiating down the RLE. Pain is rated 8/10, electric like, sharp, shotting, worse with sitting, alleviated with laying down. She has had R L4-5, L5-S1 TFESI on 1/24/23 which provided >50% relief for >4 months. She would like a repeat.  Interval HPI 09/19/2023 Pt returns for medication refill. Pain is similar as prior visits. Pt continues the present medication regimen, which helps to improve quality of life and ADLs, and reports no adverse effects. Physical therapy is helping to alleviate her pain.  Interval HPI 08/15/2023 Pt returns for medication refill. She continues PT, which helps to improve her pain. Pt continues tramadol and celebrex, which helps to alleviate her pain. She decided not to take gabapentin due to concern for side effects. She reports primarily R buttock pain, aching, throbbing, rated 7/10, aching, throbbing, worse with transitions from sitting to standing. Patient denies loss of bowel/bladder function, new motor deficits, fevers, or chills. There is associated numbness/tingling.  Initial HPI:06/01/2023 Ms. STEEN is a 67 year old F who presents for initial evaluation for low back and leg pain. Pain started 8 weeks ago and pain rated 7/10 or higher. It is not associated with any inciting injury. Pain radiates to down the L/R leg. Pain is described as shooting and electric shock when radiating. Pain is worsened with sitting, coughing and bearing down. Patient has failed conservative treatment with acetaminophen, NSAIDs, muscle relaxants, and physical therapy/HEP. Patient denies loss of bowel/bladder function, new motor deficits, fevers, or chills. There is associated numbness/tingling. She recently received b/l L4-5 TFESI on 5/9/23 with 50% ongoing relief. Pain was affecting quality of life and ADLs, but has since improved after receiving TFESI. She takes tramadol 50mg TID #90 tabs for 30 day supply, 2 refills, without adverse effect. [] : no [FreeTextEntry7] : b/l legs  [de-identified] : 05/09/2023

## 2024-03-28 NOTE — DISCUSSION/SUMMARY
[de-identified] : VICKI STEEN is a 68 year-old woman presenting for a RPV for a history of chronic low back pain with radicular features.  Prior treatment: 5/9/2023: Bilateral L4-L5 TFESI 50% relief 1/24/2023: RIGHT L4-L5, L5-S1 TFESI 80% relief  Discussed the risks and benefits of chronic opioid therapy. OK to continue as long as the medication allows the patient to have significant pain reduction and improved functional status and does not exhibit evidence of addiction or ADRB. The patient expresses having significant benefit from the prescribed opioids in pain reduction and functional improvement. [place] PDMP was checked and appropriate. Last prescription filled was on 3/2/2024 for tramadol 50mg #90 tabs Current regimen: tramadol 50mg TID MED = 15 Opioid agreement date - 1/25/2024 UDS - 2/29/2024 +tramadol (appropriate) Last naloxone prescription - 2/29/2024 ORT score - 0 (low risk) Risk factors for respiratory depression - none ADRB - none  Plan: 1) [place] PD[place] was checked and appropriate.  2) Refill tramadol 50mg TID prn #90 tabs 3) UDS reviewed from 2/29/2024 +tramadol (appropriate) 4) MRI lumbar spine images reviewed with the patient. 5) Schedule BILATERAL SI joint injection w/o MAC. The procedure was explained to the patient in detail. Reviewed risks, benefits, and alternatives with the patient. Some risks discussed included temporary increase in pain, bleeding, infection, and side effects from steroids. The patient expressed understanding and would like to proceed.  6) Increase gabapentin to 600mg with increase to TID; Discussed AEs, including sedation, dizziness, somnolence, depression. Patient told to use caution when driving or working after taking the first few doses.  7) Continue physical therapy - new script provided 8) RTC 4 weeks

## 2024-04-29 ENCOUNTER — APPOINTMENT (OUTPATIENT)
Dept: PAIN MANAGEMENT | Facility: CLINIC | Age: 69
End: 2024-04-29
Payer: MEDICARE

## 2024-04-29 VITALS — BODY MASS INDEX: 32.43 KG/M2 | HEIGHT: 63 IN | WEIGHT: 183 LBS

## 2024-04-29 PROCEDURE — 99214 OFFICE O/P EST MOD 30 MIN: CPT

## 2024-04-29 NOTE — DISCUSSION/SUMMARY
[de-identified] : VICKI STEEN is a 68 year-old woman presenting for a RPV for a history of chronic low back pain with radicular features.  Prior treatment: 5/9/2023: Bilateral L4-L5 TFESI 50% relief 1/24/2023: RIGHT L4-L5, L5-S1 TFESI 80% relief  Discussed the risks and benefits of chronic opioid therapy. OK to continue as long as the medication allows the patient to have significant pain reduction and improved functional status and does not exhibit evidence of addiction or ADRB. The patient expresses having significant benefit from the prescribed opioids in pain reduction and functional improvement. [employer] PDMP was checked and appropriate. Last prescription filled was on 4/3/2024 for tramadol 50mg #90 tabs Current regimen: tramadol 50mg TID MED = 15 Opioid agreement date - 1/25/2024 UDS - 2/29/2024 +tramadol (appropriate) Last naloxone prescription - 2/29/2024 ORT score - 0 (low risk) Risk factors for respiratory depression - none ADRB - none  Plan: 1) [employer] PDMP was checked and appropriate.  2) Refill tramadol 50mg TID prn #90 tabs 3) UDS reviewed from 2/29/2024 +tramadol (appropriate) 4) MRI lumbar spine images reviewed with the patient. 5) May consider BILATERAL SI joint injection w/o MAC in the future 6) Reduce gabapentin to 300mg TID; patient has had some LE edema,   7) Continue physical therapy - new script provided 8) RTC 4 weeks

## 2024-04-29 NOTE — HISTORY OF PRESENT ILLNESS
[Lower back] : lower back [Buttock] : buttock [Left Leg] : left leg [Right Leg] : right leg [7] : 7 [Dull/Aching] : dull/aching [5] : 5 [Radiating] : radiating [Sharp] : sharp [Stabbing] : stabbing [Throbbing] : throbbing [Intermittent] : intermittent [Household chores] : household chores [Leisure] : leisure [Work] : work [Meds] : meds [Sleep] : sleep [Physical therapy] : physical therapy [Sitting] : sitting [Standing] : standing [Walking] : walking [Stairs] : stairs [Part time] : Work status: part time [8] : 8 [6] : 6 [FreeTextEntry1] : 4/29/2024: VICKI STEEN is a 68 year-old woman presenting for a RPV for a history of chronic low back pain. The notes that she has had some improvement of her pain since increase gabapentin to 600mg three times per day. She does note that she has developed some edema in both feet. The patient has continue to take tramadol 50mg TID prn, which helps to some degree with her pain. She denies AEs with this medication, including constipation or somnolence. The patient continues to have an aching pain across the low lumbar region.  The patient states that average pain over the past week was 7/10 in severity. Mood: Patient denies depression or anxiety.  Sleep: Patient denies having difficulty with sleep 2/2 pain.   3/28/2024: VICKI STEEN is a 68 year-old woman presenting for a RPV for a history of chronic low back pain with radicular features. The patient was started on gabapentin at her last visit. She has been able to increase the medication to three times per day. She feels that this has not helped significantly with her pain but denies any side effects, including sedation or dizziness. The patient continues to have an aching pain across the low lumbar region with radiation to the right anterior thigh. She notes intermittent numbness over this distribution. No focal weakness, bowel or bladder incontinence or saddle anesthesia. The patient continues to take tramadol 50mg TID which continues to help with her pain and function. No side effects, including depression or anxiety.  The patient states that average pain over the past week was 7/10 in severity. Mood: Patient denies depression or anxiety.  Sleep: Patient denies having difficulty with sleep 2/2 pain.   2/29/2024: VICKI STEEN is a 68 year-old woman presenting for a RPV for a history of chronic low back pain with radicular features. She continues to have pain in the low back with radiation to the right anterolateral thigh and anterior leg. She notes symmetric symptoms in the left lower extremity, but states that this is less frequent. No focal numbness or weakness in the lower extremities. No bowel or bladder incontinence or saddle anesthesia. She was prescribed gabapentin at last visit, but did not start this due to concern about potential side effects. The patient continues to take tramadol 50mg TID prn, which is helpful for her pain and function. She notes occasional dry mouth but otherwise denies side effects, including sedation or constipation.  The patient states that average pain over the past week was 7/10 in severity. Mood: No depression or anxiety.  Sleep: No difficulty with sleep initiation or maintenance 2/2 pain.   1/25/2024: VICKI STEEN is a 68 year-old woman presenting for a NPV (Dr. Marcos) for a history of chronic low back pain with radicular features. She was scheduled for an BARBARA in 11/2023 but was found to have Afib in the preoperative period. She was started on ATC. She also stopped celecoxib, which had previously been very helpful for her pain. She continues to take tramadol prn as well as acetaminophen. At this point, she describes an aching pain across the low lumbar region with radiation to the right anterolateral thigh. She notes intermittent numbness and weakness in the RLE.  The patient states that average pain over the past week was 6/10 in severity.  Dr. Marcos: Interval HPI 12/19/23 Pt returns for medication refill. Pain is similar as prior visits. Pt continues the present medication regimen, which helps to improve quality of life and ADLs, and reports no adverse effects.  Interval HPI 11/21/2023 Pt returns for follow up. We cancelled BARBARA due to new onset Afib. She is now on eliquis. She continues PT, which is helping her pain. Her lumbar radicular pain is similar as prior visits. Pt continues the present medication regimen, which helps to improve quality of life and ADLs, and reports no adverse effects.  Initial HPI 10/17/2023 Pt returns for medication refill. Pain is similar as prior visits. Pt continues the present medication regimen, which helps to improve quality of life and ADLs, and reports no adverse effects.  Her lumbar radicular pain has returned, primarily radiating down the RLE. Pain is rated 8/10, electric like, sharp, shotting, worse with sitting, alleviated with laying down. She has had R L4-5, L5-S1 TFESI on 1/24/23 which provided >50% relief for >4 months. She would like a repeat.  Interval HPI 09/19/2023 Pt returns for medication refill. Pain is similar as prior visits. Pt continues the present medication regimen, which helps to improve quality of life and ADLs, and reports no adverse effects. Physical therapy is helping to alleviate her pain.  Interval HPI 08/15/2023 Pt returns for medication refill. She continues PT, which helps to improve her pain. Pt continues tramadol and celebrex, which helps to alleviate her pain. She decided not to take gabapentin due to concern for side effects. She reports primarily R buttock pain, aching, throbbing, rated 7/10, aching, throbbing, worse with transitions from sitting to standing. Patient denies loss of bowel/bladder function, new motor deficits, fevers, or chills. There is associated numbness/tingling.  Initial HPI:06/01/2023 Ms. STEEN is a 67 year old F who presents for initial evaluation for low back and leg pain. Pain started 8 weeks ago and pain rated 7/10 or higher. It is not associated with any inciting injury. Pain radiates to down the L/R leg. Pain is described as shooting and electric shock when radiating. Pain is worsened with sitting, coughing and bearing down. Patient has failed conservative treatment with acetaminophen, NSAIDs, muscle relaxants, and physical therapy/HEP. Patient denies loss of bowel/bladder function, new motor deficits, fevers, or chills. There is associated numbness/tingling. She recently received b/l L4-5 TFESI on 5/9/23 with 50% ongoing relief. Pain was affecting quality of life and ADLs, but has since improved after receiving TFESI. She takes tramadol 50mg TID #90 tabs for 30 day supply, 2 refills, without adverse effect. [] : no [FreeTextEntry7] : b/l legs  [de-identified] : 05/09/2023

## 2024-04-29 NOTE — PHYSICAL EXAM
[de-identified] : Gen: NAD Head: NC/AT Eyes: no glasses, no scleral icterus ENT: mucous membranes moist CV: no JVD Lungs: CTAB, nonlabored breathing Abd: soft, NT/ND Ext: full ROM in all extremities, +2 bilateral pitting edema in the bilateral feet to the mid leg Back: limited extension 2/2 pain, +TTP in the bilateral SI joint region, +RAISA/yeomnan's/gaenslen's bilaterally Neuro: CN intact LEs +5 L +5 R hip flexion +5 L +5 R leg extension +5 L +5 R leg flexion +5 L +5 R foot dorsiflexion +5 L +5 R foot plantarflexion +5 L +5 R EHL extension Psych: normal affect Skin: no visible lesions

## 2024-06-05 PROBLEM — M53.3 SACROILIAC JOINT DYSFUNCTION: Status: ACTIVE | Noted: 2023-08-15

## 2024-06-05 PROBLEM — G89.4 CHRONIC PAIN SYNDROME: Status: ACTIVE | Noted: 2022-05-24

## 2024-06-05 PROBLEM — M54.17 RADICULOPATHY, LUMBOSACRAL REGION: Status: ACTIVE | Noted: 2022-05-24

## 2024-06-05 PROBLEM — F11.90 CHRONIC, CONTINUOUS USE OF OPIOIDS: Status: ACTIVE | Noted: 2024-02-29

## 2024-06-05 PROBLEM — M48.00 SPINAL STENOSIS, SITE UNSPECIFIED: Status: ACTIVE | Noted: 2019-02-01

## 2024-06-06 ENCOUNTER — APPOINTMENT (OUTPATIENT)
Dept: PAIN MANAGEMENT | Facility: CLINIC | Age: 69
End: 2024-06-06
Payer: MEDICARE

## 2024-06-06 VITALS — HEIGHT: 63 IN | WEIGHT: 184 LBS | BODY MASS INDEX: 32.6 KG/M2

## 2024-06-06 DIAGNOSIS — M54.17 RADICULOPATHY, LUMBOSACRAL REGION: ICD-10-CM

## 2024-06-06 DIAGNOSIS — F11.90 OPIOID USE, UNSPECIFIED, UNCOMPLICATED: ICD-10-CM

## 2024-06-06 DIAGNOSIS — M53.3 SACROCOCCYGEAL DISORDERS, NOT ELSEWHERE CLASSIFIED: ICD-10-CM

## 2024-06-06 DIAGNOSIS — M48.00 SPINAL STENOSIS, SITE UNSPECIFIED: ICD-10-CM

## 2024-06-06 DIAGNOSIS — G89.4 CHRONIC PAIN SYNDROME: ICD-10-CM

## 2024-06-06 PROCEDURE — 99214 OFFICE O/P EST MOD 30 MIN: CPT

## 2024-06-06 RX ORDER — TRAMADOL HYDROCHLORIDE 50 MG/1
50 TABLET, COATED ORAL
Qty: 90 | Refills: 0 | Status: ACTIVE | COMMUNITY
Start: 2022-05-24 | End: 1900-01-01

## 2024-06-06 NOTE — DISCUSSION/SUMMARY
[de-identified] : VICKI STEEN is a 68 year-old woman presenting for a RPV for a history of chronic low back pain with radicular features.  Prior treatment: 5/9/2023: Bilateral L4-L5 TFESI 50% relief 1/24/2023: RIGHT L4-L5, L5-S1 TFESI 80% relief  Discussed the risks and benefits of chronic opioid therapy. OK to continue as long as the medication allows the patient to have significant pain reduction and improved functional status and does not exhibit evidence of addiction or ADRB. The patient expresses having significant benefit from the prescribed opioids in pain reduction and functional improvement. [email or web address] was checked and appropriate. Last prescription filled was on 5/1/2024 for tramadol 50mg #90 tabs Current regimen: tramadol 50mg TID MED = 15 Opioid agreement date - 1/25/2024 UDS - 2/29/2024 +tramadol (appropriate) Last naloxone prescription - 2/29/2024 ORT score - 0 (low risk) Risk factors for respiratory depression - none ADRB - none  Plan: 1) [place] PDMP was checked and appropriate.  2) Refill tramadol 50mg TID prn #90 tabs 3) UDS reviewed from 2/29/2024 +tramadol (appropriate); will obtain at next visit 4) MRI lumbar spine images reviewed with the patient. 5) Discussed a BILATERAL L4-L5 TFESI. Patient is on ELIQUIS, will need clearance to hold for 72 hours prior to procedure. The procedure was explained to the patient in detail. Reviewed risks, benefits, and alternatives with the patient. Some risks discussed included temporary increase in pain, bleeding, infection, and side effects from steroids. The patient expressed understanding and would like to defer.  6) Increase gabapentin to 600mg TID; Discussed AEs, including sedation, dizziness, somnolence, depression. Patient told to use caution when driving or working after taking the first few doses. 7) Continue physical therapy - new script provided 8) RTC 4 weeks

## 2024-06-06 NOTE — PHYSICAL EXAM
[de-identified] : Gen: NAD Head: NC/AT Eyes: no glasses, no scleral icterus ENT: mucous membranes moist CV: no JVD Lungs: CTAB, nonlabored breathing Abd: soft, NT/ND Ext: full ROM in all extremities, +2 bilateral pitting edema in the bilateral feet to the mid leg Back: limited extension 2/2 pain, +TTP in the bilateral SI joint region, +RAISA/yeomnan's/gaenslen's bilaterally Neuro: CN intact LEs +5 L +5 R hip flexion +5 L +5 R leg extension +5 L +5 R leg flexion +5 L +5 R foot dorsiflexion +5 L +5 R foot plantarflexion +5 L +5 R EHL extension Psych: normal affect Skin: no visible lesions

## 2024-06-06 NOTE — HISTORY OF PRESENT ILLNESS
[Lower back] : lower back [Buttock] : buttock [Left Leg] : left leg [Right Leg] : right leg [8] : 8 [6] : 6 [Dull/Aching] : dull/aching [Radiating] : radiating [Sharp] : sharp [Stabbing] : stabbing [Throbbing] : throbbing [Intermittent] : intermittent [Household chores] : household chores [Leisure] : leisure [Work] : work [Sleep] : sleep [Meds] : meds [Physical therapy] : physical therapy [Sitting] : sitting [Standing] : standing [Walking] : walking [Stairs] : stairs [Part time] : Work status: part time [7] : 7 [5] : 5 [FreeTextEntry1] : 6/6/2024 VICKI STEEN is a 68 year-old woman presenting for a RPV for a history of chronic low back pain. The patient increased her gabapentin to 1500mg daily which helps to some degree with her pain. She had previously had bilateral LE edema, which has improved since decreasing her BP medication. She continues to take tramadol 50mg TID prn, which helps with pain and function. The patient continues to have an aching pain across the low lumbar region. No focal numbness or weakness in the lower extremities. No bowel or bladder incontinence or saddle anesthesia. The patient states that average pain over the past week was 6/10 in severity. Mood: Patient denies depression or anxiety.  Sleep: Patient denies having difficulty with sleep 2/2 pain.   4/29/2024: VICKI STEEN is a 68 year-old woman presenting for a RPV for a history of chronic low back pain. The notes that she has had some improvement of her pain since increase gabapentin to 600mg three times per day. She does note that she has developed some edema in both feet. The patient has continue to take tramadol 50mg TID prn, which helps to some degree with her pain. She denies AEs with this medication, including constipation or somnolence. The patient continues to have an aching pain across the low lumbar region.  The patient states that average pain over the past week was 7/10 in severity. Mood: Patient denies depression or anxiety.  Sleep: Patient denies having difficulty with sleep 2/2 pain.   3/28/2024: VICKI STEEN is a 68 year-old woman presenting for a RPV for a history of chronic low back pain with radicular features. The patient was started on gabapentin at her last visit. She has been able to increase the medication to three times per day. She feels that this has not helped significantly with her pain but denies any side effects, including sedation or dizziness. The patient continues to have an aching pain across the low lumbar region with radiation to the right anterior thigh. She notes intermittent numbness over this distribution. No focal weakness, bowel or bladder incontinence or saddle anesthesia. The patient continues to take tramadol 50mg TID which continues to help with her pain and function. No side effects, including depression or anxiety.  The patient states that average pain over the past week was 7/10 in severity. Mood: Patient denies depression or anxiety.  Sleep: Patient denies having difficulty with sleep 2/2 pain.   2/29/2024: VICKI STEEN is a 68 year-old woman presenting for a RPV for a history of chronic low back pain with radicular features. She continues to have pain in the low back with radiation to the right anterolateral thigh and anterior leg. She notes symmetric symptoms in the left lower extremity, but states that this is less frequent. No focal numbness or weakness in the lower extremities. No bowel or bladder incontinence or saddle anesthesia. She was prescribed gabapentin at last visit, but did not start this due to concern about potential side effects. The patient continues to take tramadol 50mg TID prn, which is helpful for her pain and function. She notes occasional dry mouth but otherwise denies side effects, including sedation or constipation.  The patient states that average pain over the past week was 7/10 in severity. Mood: No depression or anxiety.  Sleep: No difficulty with sleep initiation or maintenance 2/2 pain.   1/25/2024: VICKI STEEN is a 68 year-old woman presenting for a NPV (Dr. Marcos) for a history of chronic low back pain with radicular features. She was scheduled for an BARBARA in 11/2023 but was found to have Afib in the preoperative period. She was started on ATC. She also stopped celecoxib, which had previously been very helpful for her pain. She continues to take tramadol prn as well as acetaminophen. At this point, she describes an aching pain across the low lumbar region with radiation to the right anterolateral thigh. She notes intermittent numbness and weakness in the RLE.  The patient states that average pain over the past week was 6/10 in severity.  Dr. Marcos: Interval HPI 12/19/23 Pt returns for medication refill. Pain is similar as prior visits. Pt continues the present medication regimen, which helps to improve quality of life and ADLs, and reports no adverse effects.  Interval HPI 11/21/2023 Pt returns for follow up. We cancelled BARBARA due to new onset Afib. She is now on eliquis. She continues PT, which is helping her pain. Her lumbar radicular pain is similar as prior visits. Pt continues the present medication regimen, which helps to improve quality of life and ADLs, and reports no adverse effects.  Initial HPI 10/17/2023 Pt returns for medication refill. Pain is similar as prior visits. Pt continues the present medication regimen, which helps to improve quality of life and ADLs, and reports no adverse effects.  Her lumbar radicular pain has returned, primarily radiating down the RLE. Pain is rated 8/10, electric like, sharp, shotting, worse with sitting, alleviated with laying down. She has had R L4-5, L5-S1 TFESI on 1/24/23 which provided >50% relief for >4 months. She would like a repeat.  Interval HPI 09/19/2023 Pt returns for medication refill. Pain is similar as prior visits. Pt continues the present medication regimen, which helps to improve quality of life and ADLs, and reports no adverse effects. Physical therapy is helping to alleviate her pain.  Interval HPI 08/15/2023 Pt returns for medication refill. She continues PT, which helps to improve her pain. Pt continues tramadol and celebrex, which helps to alleviate her pain. She decided not to take gabapentin due to concern for side effects. She reports primarily R buttock pain, aching, throbbing, rated 7/10, aching, throbbing, worse with transitions from sitting to standing. Patient denies loss of bowel/bladder function, new motor deficits, fevers, or chills. There is associated numbness/tingling.  Initial HPI:06/01/2023 Ms. STEEN is a 67 year old F who presents for initial evaluation for low back and leg pain. Pain started 8 weeks ago and pain rated 7/10 or higher. It is not associated with any inciting injury. Pain radiates to down the L/R leg. Pain is described as shooting and electric shock when radiating. Pain is worsened with sitting, coughing and bearing down. Patient has failed conservative treatment with acetaminophen, NSAIDs, muscle relaxants, and physical therapy/HEP. Patient denies loss of bowel/bladder function, new motor deficits, fevers, or chills. There is associated numbness/tingling. She recently received b/l L4-5 TFESI on 5/9/23 with 50% ongoing relief. Pain was affecting quality of life and ADLs, but has since improved after receiving TFESI. She takes tramadol 50mg TID #90 tabs for 30 day supply, 2 refills, without adverse effect. [] : no [FreeTextEntry7] : b/l legs  [de-identified] : 05/09/2023

## 2024-07-15 ENCOUNTER — APPOINTMENT (OUTPATIENT)
Dept: PAIN MANAGEMENT | Facility: CLINIC | Age: 69
End: 2024-07-15
Payer: MEDICARE

## 2024-07-15 VITALS — HEIGHT: 63 IN | WEIGHT: 186 LBS | BODY MASS INDEX: 32.96 KG/M2

## 2024-07-15 DIAGNOSIS — M53.3 SACROCOCCYGEAL DISORDERS, NOT ELSEWHERE CLASSIFIED: ICD-10-CM

## 2024-07-15 DIAGNOSIS — G89.4 CHRONIC PAIN SYNDROME: ICD-10-CM

## 2024-07-15 DIAGNOSIS — M48.00 SPINAL STENOSIS, SITE UNSPECIFIED: ICD-10-CM

## 2024-07-15 DIAGNOSIS — F11.90 OPIOID USE, UNSPECIFIED, UNCOMPLICATED: ICD-10-CM

## 2024-07-15 DIAGNOSIS — M54.17 RADICULOPATHY, LUMBOSACRAL REGION: ICD-10-CM

## 2024-07-15 PROCEDURE — 99214 OFFICE O/P EST MOD 30 MIN: CPT

## 2024-08-19 ENCOUNTER — APPOINTMENT (OUTPATIENT)
Dept: PAIN MANAGEMENT | Facility: CLINIC | Age: 69
End: 2024-08-19
Payer: MEDICARE

## 2024-08-19 VITALS — HEIGHT: 63 IN | WEIGHT: 176 LBS | BODY MASS INDEX: 31.18 KG/M2

## 2024-08-19 DIAGNOSIS — F11.90 OPIOID USE, UNSPECIFIED, UNCOMPLICATED: ICD-10-CM

## 2024-08-19 DIAGNOSIS — G89.4 CHRONIC PAIN SYNDROME: ICD-10-CM

## 2024-08-19 DIAGNOSIS — M53.3 SACROCOCCYGEAL DISORDERS, NOT ELSEWHERE CLASSIFIED: ICD-10-CM

## 2024-08-19 DIAGNOSIS — M48.00 SPINAL STENOSIS, SITE UNSPECIFIED: ICD-10-CM

## 2024-08-19 PROCEDURE — 99214 OFFICE O/P EST MOD 30 MIN: CPT

## 2024-08-19 NOTE — PHYSICAL EXAM
[de-identified] : Gen: NAD Head: NC/AT Eyes: no glasses, no scleral icterus ENT: mucous membranes moist CV: no JVD Lungs: CTAB, nonlabored breathing Abd: soft, NT/ND Ext: full ROM in all extremities, +2 bilateral pitting edema in the bilateral feet to the mid leg Back: limited extension 2/2 pain, +TTP in the bilateral SI joint region, +RAISA/yeomnan's/gaenslen's BILATERALLY Neuro: CN intact LEs +5 L +5 R hip flexion +5 L +5 R leg extension +5 L +5 R leg flexion +5 L +5 R foot dorsiflexion +5 L +5 R foot plantarflexion +5 L +5 R EHL extension Psych: normal affect Skin: no visible lesions

## 2024-08-19 NOTE — PHYSICAL EXAM
[de-identified] : Gen: NAD Head: NC/AT Eyes: no glasses, no scleral icterus ENT: mucous membranes moist CV: no JVD Lungs: CTAB, nonlabored breathing Abd: soft, NT/ND Ext: full ROM in all extremities, +2 bilateral pitting edema in the bilateral feet to the mid leg Back: limited extension 2/2 pain, +TTP in the bilateral SI joint region, +RAISA/yeomnan's/gaenslen's BILATERALLY Neuro: CN intact LEs +5 L +5 R hip flexion +5 L +5 R leg extension +5 L +5 R leg flexion +5 L +5 R foot dorsiflexion +5 L +5 R foot plantarflexion +5 L +5 R EHL extension Psych: normal affect Skin: no visible lesions

## 2024-08-19 NOTE — DISCUSSION/SUMMARY
[de-identified] : VICKI STEEN is a 68 year-old woman presenting for a RPV for a history of chronic low back pain with radicular features.  Prior treatment: 5/9/2023: Bilateral L4-L5 TFESI 50% relief 1/24/2023: RIGHT L4-L5, L5-S1 TFESI 80% relief  Discussed the risks and benefits of chronic opioid therapy. OK to continue as long as the medication allows the patient to have significant pain reduction and improved functional status and does not exhibit evidence of addiction or ADRB. The patient expresses having significant benefit from the prescribed opioids in pain reduction and functional improvement. [place] PDMP was checked and appropriate. Last prescription filled was on 6/6/2024 for tramadol 50mg #90 tabs Current regimen: tramadol 50mg TID MED = 15 Opioid agreement date - 1/25/2024 UDS - 7/15/2024 +tramadol (appropriate) Last naloxone prescription - 2/29/2024 ORT score - 0 (low risk) Risk factors for respiratory depression - none ADRB - none  Plan: 1) [place]STOP PDMP was checked and appropriate.  2) Refill tramadol 50mg TID prn #90 tabs 3) UDS reviewed 7/15/2024 +tramadol (appropriate) 4) MRI lumbar spine images reviewed with the patient. 5) Discussed a RIGHT SI joint injection. Patient is on ELIQUIS, will NOT need clearance to hold for 72 hours prior to procedure. The procedure was explained to the patient in detail. Reviewed risks, benefits, and alternatives with the patient. Some risks discussed included temporary increase in pain, bleeding, infection, and side effects from steroids. The patient expressed understanding and would like to proceed. 6) Continue gabapentin to 600mg TID; Discussed AEs, including sedation, dizziness, somnolence, depression. Patient told to use caution when driving or working after taking the first few doses. 7) Continue physical therapy  8) RTC 4 weeks

## 2024-08-19 NOTE — DISCUSSION/SUMMARY
[de-identified] : VICKI STEEN is a 68 year-old woman presenting for a RPV for a history of chronic low back pain with radicular features.  Prior treatment: 5/9/2023: Bilateral L4-L5 TFESI 50% relief 1/24/2023: RIGHT L4-L5, L5-S1 TFESI 80% relief  Discussed the risks and benefits of chronic opioid therapy. OK to continue as long as the medication allows the patient to have significant pain reduction and improved functional status and does not exhibit evidence of addiction or ADRB. The patient expresses having significant benefit from the prescribed opioids in pain reduction and functional improvement. [place] PDMP was checked and appropriate. Last prescription filled was on 6/6/2024 for tramadol 50mg #90 tabs Current regimen: tramadol 50mg TID MED = 15 Opioid agreement date - 1/25/2024 UDS - 7/15/2024 +tramadol (appropriate) Last naloxone prescription - 2/29/2024 ORT score - 0 (low risk) Risk factors for respiratory depression - none ADRB - none  Plan: 1) [place]STOP PDMP was checked and appropriate.  2) Refill tramadol 50mg TID prn #90 tabs 3) UDS reviewed 7/15/2024 +tramadol (appropriate) 4) MRI lumbar spine images reviewed with the patient. 5) Discussed a RIGHT SI joint injection. Patient is on ELIQUIS, will NOT need clearance to hold for 72 hours prior to procedure. The procedure was explained to the patient in detail. Reviewed risks, benefits, and alternatives with the patient. Some risks discussed included temporary increase in pain, bleeding, infection, and side effects from steroids. The patient expressed understanding and would like to proceed. 6) Continue gabapentin to 600mg TID; Discussed AEs, including sedation, dizziness, somnolence, depression. Patient told to use caution when driving or working after taking the first few doses. 7) Continue physical therapy  8) RTC 4 weeks

## 2024-08-19 NOTE — HISTORY OF PRESENT ILLNESS
[Lower back] : lower back [Buttock] : buttock [Left Leg] : left leg [Right Leg] : right leg [7] : 7 [6] : 6 [Dull/Aching] : dull/aching [Radiating] : radiating [Sharp] : sharp [Stabbing] : stabbing [Throbbing] : throbbing [Intermittent] : intermittent [Household chores] : household chores [Leisure] : leisure [Work] : work [Meds] : meds [Physical therapy] : physical therapy [Sitting] : sitting [Standing] : standing [Walking] : walking [Stairs] : stairs [Part time] : Work status: part time [8] : 8 [FreeTextEntry1] : 8/19/2024 VICKI STEEN is a 68 year-old woman presenting for a RPV for a history of chronic low back pain. The patient notes that her pain has been relatively well controlled since her last visit. She continues to have an aching pain in the right low lumbosacral region. No focal numbness or weakness in the lower extremities. No bowel or bladder incontinence or saddle anesthesia. The patient continues to take tramadol 50mg TID prn, denies AEs.  The patient states that average pain over the past week was 7/10 in severity. Mood: Patient denies depression or anxiety.  Sleep: Patient denies having difficulty with sleep 2/2 pain.   7/15/2024 VICKI STEEN is a 68 year-old woman presenting for a RPV for a history of chronic low back pain. The patient continues to do physical therapy, which helps with her pain. The patient continues to take tramadol 50mg TID prn. She denies AEs, including constipation or sedation. She also continues to take gabapentin 600mg TID and denies AEs with this medication.  The patient states that average pain over the past week was 7/10 in severity. Mood: Patient denies depression or anxiety.  Sleep: Patient denies having difficulty with sleep 2/2 pain.   6/6/2024 VICKI STEEN is a 68 year-old woman presenting for a RPV for a history of chronic low back pain. The patient increased her gabapentin to 1500mg daily which helps to some degree with her pain. She had previously had bilateral LE edema, which has improved since decreasing her BP medication. She continues to take tramadol 50mg TID prn, which helps with pain and function. The patient continues to have an aching pain across the low lumbar region. No focal numbness or weakness in the lower extremities. No bowel or bladder incontinence or saddle anesthesia. The patient states that average pain over the past week was 6/10 in severity. Mood: Patient denies depression or anxiety.  Sleep: Patient denies having difficulty with sleep 2/2 pain.   4/29/2024: VICKI STEEN is a 68 year-old woman presenting for a RPV for a history of chronic low back pain. The notes that she has had some improvement of her pain since increase gabapentin to 600mg three times per day. She does note that she has developed some edema in both feet. The patient has continue to take tramadol 50mg TID prn, which helps to some degree with her pain. She denies AEs with this medication, including constipation or somnolence. The patient continues to have an aching pain across the low lumbar region.  The patient states that average pain over the past week was 7/10 in severity. Mood: Patient denies depression or anxiety.  Sleep: Patient denies having difficulty with sleep 2/2 pain.   3/28/2024: VICKI STEEN is a 68 year-old woman presenting for a RPV for a history of chronic low back pain with radicular features. The patient was started on gabapentin at her last visit. She has been able to increase the medication to three times per day. She feels that this has not helped significantly with her pain but denies any side effects, including sedation or dizziness. The patient continues to have an aching pain across the low lumbar region with radiation to the right anterior thigh. She notes intermittent numbness over this distribution. No focal weakness, bowel or bladder incontinence or saddle anesthesia. The patient continues to take tramadol 50mg TID which continues to help with her pain and function. No side effects, including depression or anxiety.  The patient states that average pain over the past week was 7/10 in severity. Mood: Patient denies depression or anxiety.  Sleep: Patient denies having difficulty with sleep 2/2 pain.   2/29/2024: VICKI STEEN is a 68 year-old woman presenting for a RPV for a history of chronic low back pain with radicular features. She continues to have pain in the low back with radiation to the right anterolateral thigh and anterior leg. She notes symmetric symptoms in the left lower extremity, but states that this is less frequent. No focal numbness or weakness in the lower extremities. No bowel or bladder incontinence or saddle anesthesia. She was prescribed gabapentin at last visit, but did not start this due to concern about potential side effects. The patient continues to take tramadol 50mg TID prn, which is helpful for her pain and function. She notes occasional dry mouth but otherwise denies side effects, including sedation or constipation.  The patient states that average pain over the past week was 7/10 in severity. Mood: No depression or anxiety.  Sleep: No difficulty with sleep initiation or maintenance 2/2 pain.   1/25/2024: VICKI STEEN is a 68 year-old woman presenting for a NPV (Dr. Marcos) for a history of chronic low back pain with radicular features. She was scheduled for an BARBARA in 11/2023 but was found to have Afib in the preoperative period. She was started on ATC. She also stopped celecoxib, which had previously been very helpful for her pain. She continues to take tramadol prn as well as acetaminophen. At this point, she describes an aching pain across the low lumbar region with radiation to the right anterolateral thigh. She notes intermittent numbness and weakness in the RLE.  The patient states that average pain over the past week was 6/10 in severity.  Dr. Marcos: Interval HPI 12/19/23 Pt returns for medication refill. Pain is similar as prior visits. Pt continues the present medication regimen, which helps to improve quality of life and ADLs, and reports no adverse effects.  Interval HPI 11/21/2023 Pt returns for follow up. We cancelled BARBARA due to new onset Afib. She is now on eliquis. She continues PT, which is helping her pain. Her lumbar radicular pain is similar as prior visits. Pt continues the present medication regimen, which helps to improve quality of life and ADLs, and reports no adverse effects.  Initial HPI 10/17/2023 Pt returns for medication refill. Pain is similar as prior visits. Pt continues the present medication regimen, which helps to improve quality of life and ADLs, and reports no adverse effects.  Her lumbar radicular pain has returned, primarily radiating down the RLE. Pain is rated 8/10, electric like, sharp, shotting, worse with sitting, alleviated with laying down. She has had R L4-5, L5-S1 TFESI on 1/24/23 which provided >50% relief for >4 months. She would like a repeat.  Interval HPI 09/19/2023 Pt returns for medication refill. Pain is similar as prior visits. Pt continues the present medication regimen, which helps to improve quality of life and ADLs, and reports no adverse effects. Physical therapy is helping to alleviate her pain.  Interval HPI 08/15/2023 Pt returns for medication refill. She continues PT, which helps to improve her pain. Pt continues tramadol and celebrex, which helps to alleviate her pain. She decided not to take gabapentin due to concern for side effects. She reports primarily R buttock pain, aching, throbbing, rated 7/10, aching, throbbing, worse with transitions from sitting to standing. Patient denies loss of bowel/bladder function, new motor deficits, fevers, or chills. There is associated numbness/tingling.  Initial HPI:06/01/2023 Ms. STEEN is a 67 year old F who presents for initial evaluation for low back and leg pain. Pain started 8 weeks ago and pain rated 7/10 or higher. It is not associated with any inciting injury. Pain radiates to down the L/R leg. Pain is described as shooting and electric shock when radiating. Pain is worsened with sitting, coughing and bearing down. Patient has failed conservative treatment with acetaminophen, NSAIDs, muscle relaxants, and physical therapy/HEP. Patient denies loss of bowel/bladder function, new motor deficits, fevers, or chills. There is associated numbness/tingling. She recently received b/l L4-5 TFESI on 5/9/23 with 50% ongoing relief. Pain was affecting quality of life and ADLs, but has since improved after receiving TFESI. She takes tramadol 50mg TID #90 tabs for 30 day supply, 2 refills, without adverse effect. [] : no [FreeTextEntry7] : b/l legs  [de-identified] : in the morning  [de-identified] : 05/09/2023

## 2024-08-19 NOTE — HISTORY OF PRESENT ILLNESS
[Lower back] : lower back [Buttock] : buttock [Left Leg] : left leg [Right Leg] : right leg [7] : 7 [6] : 6 [Dull/Aching] : dull/aching [Radiating] : radiating [Sharp] : sharp [Stabbing] : stabbing [Throbbing] : throbbing [Intermittent] : intermittent [Household chores] : household chores [Leisure] : leisure [Work] : work [Meds] : meds [Physical therapy] : physical therapy [Sitting] : sitting [Standing] : standing [Walking] : walking [Stairs] : stairs [Part time] : Work status: part time [8] : 8 [FreeTextEntry1] : 8/19/2024 VICKI STEEN is a 68 year-old woman presenting for a RPV for a history of chronic low back pain. The patient notes that her pain has been relatively well controlled since her last visit. She continues to have an aching pain in the right low lumbosacral region. No focal numbness or weakness in the lower extremities. No bowel or bladder incontinence or saddle anesthesia. The patient continues to take tramadol 50mg TID prn, denies AEs.  The patient states that average pain over the past week was 7/10 in severity. Mood: Patient denies depression or anxiety.  Sleep: Patient denies having difficulty with sleep 2/2 pain.   7/15/2024 VICKI STEEN is a 68 year-old woman presenting for a RPV for a history of chronic low back pain. The patient continues to do physical therapy, which helps with her pain. The patient continues to take tramadol 50mg TID prn. She denies AEs, including constipation or sedation. She also continues to take gabapentin 600mg TID and denies AEs with this medication.  The patient states that average pain over the past week was 7/10 in severity. Mood: Patient denies depression or anxiety.  Sleep: Patient denies having difficulty with sleep 2/2 pain.   6/6/2024 VICKI STEEN is a 68 year-old woman presenting for a RPV for a history of chronic low back pain. The patient increased her gabapentin to 1500mg daily which helps to some degree with her pain. She had previously had bilateral LE edema, which has improved since decreasing her BP medication. She continues to take tramadol 50mg TID prn, which helps with pain and function. The patient continues to have an aching pain across the low lumbar region. No focal numbness or weakness in the lower extremities. No bowel or bladder incontinence or saddle anesthesia. The patient states that average pain over the past week was 6/10 in severity. Mood: Patient denies depression or anxiety.  Sleep: Patient denies having difficulty with sleep 2/2 pain.   4/29/2024: VICKI STEEN is a 68 year-old woman presenting for a RPV for a history of chronic low back pain. The notes that she has had some improvement of her pain since increase gabapentin to 600mg three times per day. She does note that she has developed some edema in both feet. The patient has continue to take tramadol 50mg TID prn, which helps to some degree with her pain. She denies AEs with this medication, including constipation or somnolence. The patient continues to have an aching pain across the low lumbar region.  The patient states that average pain over the past week was 7/10 in severity. Mood: Patient denies depression or anxiety.  Sleep: Patient denies having difficulty with sleep 2/2 pain.   3/28/2024: VICKI STEEN is a 68 year-old woman presenting for a RPV for a history of chronic low back pain with radicular features. The patient was started on gabapentin at her last visit. She has been able to increase the medication to three times per day. She feels that this has not helped significantly with her pain but denies any side effects, including sedation or dizziness. The patient continues to have an aching pain across the low lumbar region with radiation to the right anterior thigh. She notes intermittent numbness over this distribution. No focal weakness, bowel or bladder incontinence or saddle anesthesia. The patient continues to take tramadol 50mg TID which continues to help with her pain and function. No side effects, including depression or anxiety.  The patient states that average pain over the past week was 7/10 in severity. Mood: Patient denies depression or anxiety.  Sleep: Patient denies having difficulty with sleep 2/2 pain.   2/29/2024: VICKI STEEN is a 68 year-old woman presenting for a RPV for a history of chronic low back pain with radicular features. She continues to have pain in the low back with radiation to the right anterolateral thigh and anterior leg. She notes symmetric symptoms in the left lower extremity, but states that this is less frequent. No focal numbness or weakness in the lower extremities. No bowel or bladder incontinence or saddle anesthesia. She was prescribed gabapentin at last visit, but did not start this due to concern about potential side effects. The patient continues to take tramadol 50mg TID prn, which is helpful for her pain and function. She notes occasional dry mouth but otherwise denies side effects, including sedation or constipation.  The patient states that average pain over the past week was 7/10 in severity. Mood: No depression or anxiety.  Sleep: No difficulty with sleep initiation or maintenance 2/2 pain.   1/25/2024: VICKI STEEN is a 68 year-old woman presenting for a NPV (Dr. Marcos) for a history of chronic low back pain with radicular features. She was scheduled for an BARBARA in 11/2023 but was found to have Afib in the preoperative period. She was started on ATC. She also stopped celecoxib, which had previously been very helpful for her pain. She continues to take tramadol prn as well as acetaminophen. At this point, she describes an aching pain across the low lumbar region with radiation to the right anterolateral thigh. She notes intermittent numbness and weakness in the RLE.  The patient states that average pain over the past week was 6/10 in severity.  Dr. Marcos: Interval HPI 12/19/23 Pt returns for medication refill. Pain is similar as prior visits. Pt continues the present medication regimen, which helps to improve quality of life and ADLs, and reports no adverse effects.  Interval HPI 11/21/2023 Pt returns for follow up. We cancelled BARBARA due to new onset Afib. She is now on eliquis. She continues PT, which is helping her pain. Her lumbar radicular pain is similar as prior visits. Pt continues the present medication regimen, which helps to improve quality of life and ADLs, and reports no adverse effects.  Initial HPI 10/17/2023 Pt returns for medication refill. Pain is similar as prior visits. Pt continues the present medication regimen, which helps to improve quality of life and ADLs, and reports no adverse effects.  Her lumbar radicular pain has returned, primarily radiating down the RLE. Pain is rated 8/10, electric like, sharp, shotting, worse with sitting, alleviated with laying down. She has had R L4-5, L5-S1 TFESI on 1/24/23 which provided >50% relief for >4 months. She would like a repeat.  Interval HPI 09/19/2023 Pt returns for medication refill. Pain is similar as prior visits. Pt continues the present medication regimen, which helps to improve quality of life and ADLs, and reports no adverse effects. Physical therapy is helping to alleviate her pain.  Interval HPI 08/15/2023 Pt returns for medication refill. She continues PT, which helps to improve her pain. Pt continues tramadol and celebrex, which helps to alleviate her pain. She decided not to take gabapentin due to concern for side effects. She reports primarily R buttock pain, aching, throbbing, rated 7/10, aching, throbbing, worse with transitions from sitting to standing. Patient denies loss of bowel/bladder function, new motor deficits, fevers, or chills. There is associated numbness/tingling.  Initial HPI:06/01/2023 Ms. STEEN is a 67 year old F who presents for initial evaluation for low back and leg pain. Pain started 8 weeks ago and pain rated 7/10 or higher. It is not associated with any inciting injury. Pain radiates to down the L/R leg. Pain is described as shooting and electric shock when radiating. Pain is worsened with sitting, coughing and bearing down. Patient has failed conservative treatment with acetaminophen, NSAIDs, muscle relaxants, and physical therapy/HEP. Patient denies loss of bowel/bladder function, new motor deficits, fevers, or chills. There is associated numbness/tingling. She recently received b/l L4-5 TFESI on 5/9/23 with 50% ongoing relief. Pain was affecting quality of life and ADLs, but has since improved after receiving TFESI. She takes tramadol 50mg TID #90 tabs for 30 day supply, 2 refills, without adverse effect. [] : no [FreeTextEntry7] : b/l legs  [de-identified] : in the morning  [de-identified] : 05/09/2023

## 2024-09-17 ENCOUNTER — APPOINTMENT (OUTPATIENT)
Dept: PAIN MANAGEMENT | Facility: CLINIC | Age: 69
End: 2024-09-17

## 2024-09-17 PROCEDURE — 27096 INJECT SACROILIAC JOINT: CPT | Mod: RT

## 2024-09-17 NOTE — PROCEDURE
[FreeTextEntry1] : RIGHT sacroiliac joint injection [FreeTextEntry2] : sacroiliitis [FreeTextEntry3] : Procedure Date: 09/17/2024   Preoperative Diagnosis: sacroiliitis   Procedure: RIGHT sacroiliac joint injection under fluoroscopic guidance   Anesthesia: local   Complications: none   EBL: none   Procedure in detail: Patient was seen and examined. Risks, benefits, and alternatives for the procedure were discussed with the patient in detail. The patient expressed understanding, gave written and verbal consent, and placed themselves in a prone position on the procedure table. Skin overlying the lumbosacral spine was prepped with chloraprep and draped in the usual sterile fashion. Fluoroscopic images were obtained of the RIGHT sacroiliac joint. Target for the procedure was the distal 1/3 of the joint. Skin overlying the target was marked and infiltrated with 1% lidocaine. Using a 22 gauge, 5 inch spinal needle, this was inserted and advanced under intermittent fluoroscopic guidance. When felt to be engaged in the SI joint, lateral view was used to confirm depth. After negative aspiration for heme/csf, contrast was injected under live fluoroscopy, which showed contrast spread throughout the joint. At this point, a total of 3ml of injectate, which consisted of 1ml of 80mg/ml depomedrol and 2ml of 0.25% bupivacaine were injected. The needle was restyletted and withdrawn, a band aid was placed over the injection site. Patient tolerated the procedure well. The patient recovered uneventfully and was discharged home in stable condition.

## 2024-09-26 ENCOUNTER — APPOINTMENT (OUTPATIENT)
Dept: PAIN MANAGEMENT | Facility: CLINIC | Age: 69
End: 2024-09-26
Payer: MEDICARE

## 2024-09-26 VITALS — HEIGHT: 63 IN | WEIGHT: 174 LBS | BODY MASS INDEX: 30.83 KG/M2

## 2024-09-26 DIAGNOSIS — M53.3 SACROCOCCYGEAL DISORDERS, NOT ELSEWHERE CLASSIFIED: ICD-10-CM

## 2024-09-26 DIAGNOSIS — M54.17 RADICULOPATHY, LUMBOSACRAL REGION: ICD-10-CM

## 2024-09-26 DIAGNOSIS — M48.00 SPINAL STENOSIS, SITE UNSPECIFIED: ICD-10-CM

## 2024-09-26 DIAGNOSIS — G89.4 CHRONIC PAIN SYNDROME: ICD-10-CM

## 2024-09-26 DIAGNOSIS — F11.90 OPIOID USE, UNSPECIFIED, UNCOMPLICATED: ICD-10-CM

## 2024-09-26 PROCEDURE — 99214 OFFICE O/P EST MOD 30 MIN: CPT

## 2024-09-26 NOTE — DISCUSSION/SUMMARY
[de-identified] : VICKI STEEN is a 69 year-old woman presenting for a RPV for a history of chronic low back pain with radicular features.  Prior treatment: 9/17/2024: RIGHT SI joint injection w/o MAC w/ 60% improvement of pain and function  5/9/2023: Bilateral L4-L5 TFESI 50% relief 1/24/2023: RIGHT L4-L5, L5-S1 TFESI 80% relief  Discussed the risks and benefits of chronic opioid therapy. OK to continue as long as the medication allows the patient to have significant pain reduction and improved functional status and does not exhibit evidence of addiction or ADRB. The patient expresses having significant benefit from the prescribed opioids in pain reduction and functional improvement. [place] ISTOP PDMP was checked and appropriate. Last prescription filled was on 8/20/2024 for tramadol 50mg #90 tabs Current regimen: tramadol 50mg TID MED = 15 Opioid agreement date - 1/25/2024 UDS - 7/15/2024 +tramadol (appropriate) Last naloxone prescription - 2/29/2024 ORT score - 0 (low risk) Risk factors for respiratory depression - none ADRB - none  Plan: 1) [place] ISTOP PDMP was checked and appropriate.  2) Refill tramadol 50mg TID prn #90 tabs w/ 1 RF 3) UDS reviewed 7/15/2024 +tramadol (appropriate) 4) MRI lumbar spine images reviewed with the patient. 5) Consider BILATERAL SI joint injection in the future. Patient is on ELIQUIS, will NOT need clearance to hold for 72 hours prior to procedure. 6) Continue gabapentin to 600mg TID; Discussed AEs, including sedation, dizziness, somnolence, depression. Patient told to use caution when driving or working after taking the first few doses. 7) Continue physical therapy  8) RTC 2 months

## 2024-09-26 NOTE — PHYSICAL EXAM
[de-identified] : Gen: NAD Head: NC/AT Eyes: no glasses, no scleral icterus ENT: mucous membranes moist CV: no JVD Lungs: CTAB, nonlabored breathing Abd: soft, NT/ND Ext: full ROM in all extremities, +2 bilateral pitting edema in the bilateral feet to the mid leg Back: limited extension 2/2 pain, +TTP in the bilateral SI joint region, +RAISA/yeomnan's/gaenslen's BILATERALLY Neuro: CN intact LEs +5 L +5 R hip flexion +5 L +5 R leg extension +5 L +5 R leg flexion +5 L +5 R foot dorsiflexion +5 L +5 R foot plantarflexion +5 L +5 R EHL extension Psych: normal affect Skin: no visible lesions

## 2024-09-26 NOTE — PHYSICAL EXAM
[de-identified] : Gen: NAD Head: NC/AT Eyes: no glasses, no scleral icterus ENT: mucous membranes moist CV: no JVD Lungs: CTAB, nonlabored breathing Abd: soft, NT/ND Ext: full ROM in all extremities, +2 bilateral pitting edema in the bilateral feet to the mid leg Back: limited extension 2/2 pain, +TTP in the bilateral SI joint region, +RAISA/yeomnan's/gaenslen's BILATERALLY Neuro: CN intact LEs +5 L +5 R hip flexion +5 L +5 R leg extension +5 L +5 R leg flexion +5 L +5 R foot dorsiflexion +5 L +5 R foot plantarflexion +5 L +5 R EHL extension Psych: normal affect Skin: no visible lesions

## 2024-09-26 NOTE — HISTORY OF PRESENT ILLNESS
[Lower back] : lower back [Buttock] : buttock [Left Leg] : left leg [Right Leg] : right leg [8] : 8 [Dull/Aching] : dull/aching [Radiating] : radiating [Sharp] : sharp [Stabbing] : stabbing [Throbbing] : throbbing [Intermittent] : intermittent [Household chores] : household chores [Leisure] : leisure [Work] : work [Meds] : meds [Physical therapy] : physical therapy [Sitting] : sitting [Standing] : standing [Walking] : walking [Stairs] : stairs [Part time] : Work status: part time [6] : 6 [5] : 5 [FreeTextEntry1] : 9/26/2024: VICKI STEEN is a 69 year-old woman presenting for a RPV for a history of chronic low back pain. The patient underwent a RIGHT SI joint injection w/o MAC on 9/17/2024. The patient notes having 60% improvement of pain and function since the procedure. However, the patient notes that she has started to have pain in the left lumbosacral region and gluteal area. She has continued to take tramadol 50mg TID prn and notes that this helps with pain and function. No AEs.  The patient states that average pain over the past week was 5/10 in severity. Mood: Patient denies depression or anxiety.  Sleep: Patient denies having difficulty with sleep 2/2 pain.   8/19/2024 VICKI STEEN is a 68 year-old woman presenting for a RPV for a history of chronic low back pain. The patient notes that her pain has been relatively well controlled since her last visit. She continues to have an aching pain in the right low lumbosacral region. No focal numbness or weakness in the lower extremities. No bowel or bladder incontinence or saddle anesthesia. The patient continues to take tramadol 50mg TID prn, denies AEs.  The patient states that average pain over the past week was 7/10 in severity. Mood: Patient denies depression or anxiety.  Sleep: Patient denies having difficulty with sleep 2/2 pain.   7/15/2024 VICKI STEEN is a 68 year-old woman presenting for a RPV for a history of chronic low back pain. The patient continues to do physical therapy, which helps with her pain. The patient continues to take tramadol 50mg TID prn. She denies AEs, including constipation or sedation. She also continues to take gabapentin 600mg TID and denies AEs with this medication.  The patient states that average pain over the past week was 7/10 in severity. Mood: Patient denies depression or anxiety.  Sleep: Patient denies having difficulty with sleep 2/2 pain.   6/6/2024 VICKI STEEN is a 68 year-old woman presenting for a RPV for a history of chronic low back pain. The patient increased her gabapentin to 1500mg daily which helps to some degree with her pain. She had previously had bilateral LE edema, which has improved since decreasing her BP medication. She continues to take tramadol 50mg TID prn, which helps with pain and function. The patient continues to have an aching pain across the low lumbar region. No focal numbness or weakness in the lower extremities. No bowel or bladder incontinence or saddle anesthesia. The patient states that average pain over the past week was 6/10 in severity. Mood: Patient denies depression or anxiety.  Sleep: Patient denies having difficulty with sleep 2/2 pain.   4/29/2024: VICKI STEEN is a 68 year-old woman presenting for a RPV for a history of chronic low back pain. The notes that she has had some improvement of her pain since increase gabapentin to 600mg three times per day. She does note that she has developed some edema in both feet. The patient has continue to take tramadol 50mg TID prn, which helps to some degree with her pain. She denies AEs with this medication, including constipation or somnolence. The patient continues to have an aching pain across the low lumbar region.  The patient states that average pain over the past week was 7/10 in severity. Mood: Patient denies depression or anxiety.  Sleep: Patient denies having difficulty with sleep 2/2 pain.   3/28/2024: VICKI STEEN is a 68 year-old woman presenting for a RPV for a history of chronic low back pain with radicular features. The patient was started on gabapentin at her last visit. She has been able to increase the medication to three times per day. She feels that this has not helped significantly with her pain but denies any side effects, including sedation or dizziness. The patient continues to have an aching pain across the low lumbar region with radiation to the right anterior thigh. She notes intermittent numbness over this distribution. No focal weakness, bowel or bladder incontinence or saddle anesthesia. The patient continues to take tramadol 50mg TID which continues to help with her pain and function. No side effects, including depression or anxiety.  The patient states that average pain over the past week was 7/10 in severity. Mood: Patient denies depression or anxiety.  Sleep: Patient denies having difficulty with sleep 2/2 pain.   2/29/2024: VICKI STEEN is a 68 year-old woman presenting for a RPV for a history of chronic low back pain with radicular features. She continues to have pain in the low back with radiation to the right anterolateral thigh and anterior leg. She notes symmetric symptoms in the left lower extremity, but states that this is less frequent. No focal numbness or weakness in the lower extremities. No bowel or bladder incontinence or saddle anesthesia. She was prescribed gabapentin at last visit, but did not start this due to concern about potential side effects. The patient continues to take tramadol 50mg TID prn, which is helpful for her pain and function. She notes occasional dry mouth but otherwise denies side effects, including sedation or constipation.  The patient states that average pain over the past week was 7/10 in severity. Mood: No depression or anxiety.  Sleep: No difficulty with sleep initiation or maintenance 2/2 pain.   1/25/2024: VICKI STEEN is a 68 year-old woman presenting for a NPV (Dr. Marcos) for a history of chronic low back pain with radicular features. She was scheduled for an BARBARA in 11/2023 but was found to have Afib in the preoperative period. She was started on ATC. She also stopped celecoxib, which had previously been very helpful for her pain. She continues to take tramadol prn as well as acetaminophen. At this point, she describes an aching pain across the low lumbar region with radiation to the right anterolateral thigh. She notes intermittent numbness and weakness in the RLE.  The patient states that average pain over the past week was 6/10 in severity.  Dr. Marcos: Interval HPI 12/19/23 Pt returns for medication refill. Pain is similar as prior visits. Pt continues the present medication regimen, which helps to improve quality of life and ADLs, and reports no adverse effects.  Interval HPI 11/21/2023 Pt returns for follow up. We cancelled BARBARA due to new onset Afib. She is now on eliquis. She continues PT, which is helping her pain. Her lumbar radicular pain is similar as prior visits. Pt continues the present medication regimen, which helps to improve quality of life and ADLs, and reports no adverse effects.  Initial HPI 10/17/2023 Pt returns for medication refill. Pain is similar as prior visits. Pt continues the present medication regimen, which helps to improve quality of life and ADLs, and reports no adverse effects.  Her lumbar radicular pain has returned, primarily radiating down the RLE. Pain is rated 8/10, electric like, sharp, shotting, worse with sitting, alleviated with laying down. She has had R L4-5, L5-S1 TFESI on 1/24/23 which provided >50% relief for >4 months. She would like a repeat.  Interval HPI 09/19/2023 Pt returns for medication refill. Pain is similar as prior visits. Pt continues the present medication regimen, which helps to improve quality of life and ADLs, and reports no adverse effects. Physical therapy is helping to alleviate her pain.  Interval HPI 08/15/2023 Pt returns for medication refill. She continues PT, which helps to improve her pain. Pt continues tramadol and celebrex, which helps to alleviate her pain. She decided not to take gabapentin due to concern for side effects. She reports primarily R buttock pain, aching, throbbing, rated 7/10, aching, throbbing, worse with transitions from sitting to standing. Patient denies loss of bowel/bladder function, new motor deficits, fevers, or chills. There is associated numbness/tingling.  Initial HPI:06/01/2023 Ms. STEEN is a 67 year old F who presents for initial evaluation for low back and leg pain. Pain started 8 weeks ago and pain rated 7/10 or higher. It is not associated with any inciting injury. Pain radiates to down the L/R leg. Pain is described as shooting and electric shock when radiating. Pain is worsened with sitting, coughing and bearing down. Patient has failed conservative treatment with acetaminophen, NSAIDs, muscle relaxants, and physical therapy/HEP. Patient denies loss of bowel/bladder function, new motor deficits, fevers, or chills. There is associated numbness/tingling. She recently received b/l L4-5 TFESI on 5/9/23 with 50% ongoing relief. Pain was affecting quality of life and ADLs, but has since improved after receiving TFESI. She takes tramadol 50mg TID #90 tabs for 30 day supply, 2 refills, without adverse effect. [] : no [FreeTextEntry7] : b/l legs  [de-identified] : in the morning  [de-identified] : 05/09/2023 [TWNoteComboBox1] : 50%

## 2024-09-26 NOTE — HISTORY OF PRESENT ILLNESS
[Lower back] : lower back [Buttock] : buttock [Left Leg] : left leg [Right Leg] : right leg [8] : 8 [Dull/Aching] : dull/aching [Radiating] : radiating [Sharp] : sharp [Stabbing] : stabbing [Throbbing] : throbbing [Intermittent] : intermittent [Household chores] : household chores [Leisure] : leisure [Work] : work [Meds] : meds [Physical therapy] : physical therapy [Sitting] : sitting [Standing] : standing [Walking] : walking [Stairs] : stairs [Part time] : Work status: part time [6] : 6 [5] : 5 [FreeTextEntry1] : 9/26/2024: VICKI STEEN is a 69 year-old woman presenting for a RPV for a history of chronic low back pain. The patient underwent a RIGHT SI joint injection w/o MAC on 9/17/2024. The patient notes having 60% improvement of pain and function since the procedure. However, the patient notes that she has started to have pain in the left lumbosacral region and gluteal area. She has continued to take tramadol 50mg TID prn and notes that this helps with pain and function. No AEs.  The patient states that average pain over the past week was 5/10 in severity. Mood: Patient denies depression or anxiety.  Sleep: Patient denies having difficulty with sleep 2/2 pain.   8/19/2024 VICKI STEEN is a 68 year-old woman presenting for a RPV for a history of chronic low back pain. The patient notes that her pain has been relatively well controlled since her last visit. She continues to have an aching pain in the right low lumbosacral region. No focal numbness or weakness in the lower extremities. No bowel or bladder incontinence or saddle anesthesia. The patient continues to take tramadol 50mg TID prn, denies AEs.  The patient states that average pain over the past week was 7/10 in severity. Mood: Patient denies depression or anxiety.  Sleep: Patient denies having difficulty with sleep 2/2 pain.   7/15/2024 VICKI STEEN is a 68 year-old woman presenting for a RPV for a history of chronic low back pain. The patient continues to do physical therapy, which helps with her pain. The patient continues to take tramadol 50mg TID prn. She denies AEs, including constipation or sedation. She also continues to take gabapentin 600mg TID and denies AEs with this medication.  The patient states that average pain over the past week was 7/10 in severity. Mood: Patient denies depression or anxiety.  Sleep: Patient denies having difficulty with sleep 2/2 pain.   6/6/2024 VICKI STEEN is a 68 year-old woman presenting for a RPV for a history of chronic low back pain. The patient increased her gabapentin to 1500mg daily which helps to some degree with her pain. She had previously had bilateral LE edema, which has improved since decreasing her BP medication. She continues to take tramadol 50mg TID prn, which helps with pain and function. The patient continues to have an aching pain across the low lumbar region. No focal numbness or weakness in the lower extremities. No bowel or bladder incontinence or saddle anesthesia. The patient states that average pain over the past week was 6/10 in severity. Mood: Patient denies depression or anxiety.  Sleep: Patient denies having difficulty with sleep 2/2 pain.   4/29/2024: VICKI STEEN is a 68 year-old woman presenting for a RPV for a history of chronic low back pain. The notes that she has had some improvement of her pain since increase gabapentin to 600mg three times per day. She does note that she has developed some edema in both feet. The patient has continue to take tramadol 50mg TID prn, which helps to some degree with her pain. She denies AEs with this medication, including constipation or somnolence. The patient continues to have an aching pain across the low lumbar region.  The patient states that average pain over the past week was 7/10 in severity. Mood: Patient denies depression or anxiety.  Sleep: Patient denies having difficulty with sleep 2/2 pain.   3/28/2024: VICKI STEEN is a 68 year-old woman presenting for a RPV for a history of chronic low back pain with radicular features. The patient was started on gabapentin at her last visit. She has been able to increase the medication to three times per day. She feels that this has not helped significantly with her pain but denies any side effects, including sedation or dizziness. The patient continues to have an aching pain across the low lumbar region with radiation to the right anterior thigh. She notes intermittent numbness over this distribution. No focal weakness, bowel or bladder incontinence or saddle anesthesia. The patient continues to take tramadol 50mg TID which continues to help with her pain and function. No side effects, including depression or anxiety.  The patient states that average pain over the past week was 7/10 in severity. Mood: Patient denies depression or anxiety.  Sleep: Patient denies having difficulty with sleep 2/2 pain.   2/29/2024: VICKI STEEN is a 68 year-old woman presenting for a RPV for a history of chronic low back pain with radicular features. She continues to have pain in the low back with radiation to the right anterolateral thigh and anterior leg. She notes symmetric symptoms in the left lower extremity, but states that this is less frequent. No focal numbness or weakness in the lower extremities. No bowel or bladder incontinence or saddle anesthesia. She was prescribed gabapentin at last visit, but did not start this due to concern about potential side effects. The patient continues to take tramadol 50mg TID prn, which is helpful for her pain and function. She notes occasional dry mouth but otherwise denies side effects, including sedation or constipation.  The patient states that average pain over the past week was 7/10 in severity. Mood: No depression or anxiety.  Sleep: No difficulty with sleep initiation or maintenance 2/2 pain.   1/25/2024: VICKI STEEN is a 68 year-old woman presenting for a NPV (Dr. Marcos) for a history of chronic low back pain with radicular features. She was scheduled for an BARBARA in 11/2023 but was found to have Afib in the preoperative period. She was started on ATC. She also stopped celecoxib, which had previously been very helpful for her pain. She continues to take tramadol prn as well as acetaminophen. At this point, she describes an aching pain across the low lumbar region with radiation to the right anterolateral thigh. She notes intermittent numbness and weakness in the RLE.  The patient states that average pain over the past week was 6/10 in severity.  Dr. Marcos: Interval HPI 12/19/23 Pt returns for medication refill. Pain is similar as prior visits. Pt continues the present medication regimen, which helps to improve quality of life and ADLs, and reports no adverse effects.  Interval HPI 11/21/2023 Pt returns for follow up. We cancelled BARBARA due to new onset Afib. She is now on eliquis. She continues PT, which is helping her pain. Her lumbar radicular pain is similar as prior visits. Pt continues the present medication regimen, which helps to improve quality of life and ADLs, and reports no adverse effects.  Initial HPI 10/17/2023 Pt returns for medication refill. Pain is similar as prior visits. Pt continues the present medication regimen, which helps to improve quality of life and ADLs, and reports no adverse effects.  Her lumbar radicular pain has returned, primarily radiating down the RLE. Pain is rated 8/10, electric like, sharp, shotting, worse with sitting, alleviated with laying down. She has had R L4-5, L5-S1 TFESI on 1/24/23 which provided >50% relief for >4 months. She would like a repeat.  Interval HPI 09/19/2023 Pt returns for medication refill. Pain is similar as prior visits. Pt continues the present medication regimen, which helps to improve quality of life and ADLs, and reports no adverse effects. Physical therapy is helping to alleviate her pain.  Interval HPI 08/15/2023 Pt returns for medication refill. She continues PT, which helps to improve her pain. Pt continues tramadol and celebrex, which helps to alleviate her pain. She decided not to take gabapentin due to concern for side effects. She reports primarily R buttock pain, aching, throbbing, rated 7/10, aching, throbbing, worse with transitions from sitting to standing. Patient denies loss of bowel/bladder function, new motor deficits, fevers, or chills. There is associated numbness/tingling.  Initial HPI:06/01/2023 Ms. STEEN is a 67 year old F who presents for initial evaluation for low back and leg pain. Pain started 8 weeks ago and pain rated 7/10 or higher. It is not associated with any inciting injury. Pain radiates to down the L/R leg. Pain is described as shooting and electric shock when radiating. Pain is worsened with sitting, coughing and bearing down. Patient has failed conservative treatment with acetaminophen, NSAIDs, muscle relaxants, and physical therapy/HEP. Patient denies loss of bowel/bladder function, new motor deficits, fevers, or chills. There is associated numbness/tingling. She recently received b/l L4-5 TFESI on 5/9/23 with 50% ongoing relief. Pain was affecting quality of life and ADLs, but has since improved after receiving TFESI. She takes tramadol 50mg TID #90 tabs for 30 day supply, 2 refills, without adverse effect. [] : no [FreeTextEntry7] : b/l legs  [de-identified] : in the morning  [de-identified] : 05/09/2023 [TWNoteComboBox1] : 50%

## 2024-09-26 NOTE — DISCUSSION/SUMMARY
[de-identified] : VICKI STEEN is a 69 year-old woman presenting for a RPV for a history of chronic low back pain with radicular features.  Prior treatment: 9/17/2024: RIGHT SI joint injection w/o MAC w/ 60% improvement of pain and function  5/9/2023: Bilateral L4-L5 TFESI 50% relief 1/24/2023: RIGHT L4-L5, L5-S1 TFESI 80% relief  Discussed the risks and benefits of chronic opioid therapy. OK to continue as long as the medication allows the patient to have significant pain reduction and improved functional status and does not exhibit evidence of addiction or ADRB. The patient expresses having significant benefit from the prescribed opioids in pain reduction and functional improvement. [place] PDMP was checked and appropriate. Last prescription filled was on 8/20/2024 for tramadol 50mg #90 tabs Current regimen: tramadol 50mg TID MED = 15 Opioid agreement date - 1/25/2024 UDS - 7/15/2024 +tramadol (appropriate) Last naloxone prescription - 2/29/2024 ORT score - 0 (low risk) Risk factors for respiratory depression - none ADRB - none  Plan: 1) [place] PDMP was checked and appropriate.  2) Refill tramadol 50mg TID prn #90 tabs w/ 1 RF 3) UDS reviewed 7/15/2024 +tramadol (appropriate) 4) MRI lumbar spine images reviewed with the patient. 5) Consider BILATERAL SI joint injection in the future. Patient is on ELIQUIS, will NOT need clearance to hold for 72 hours prior to procedure. 6) Continue gabapentin to 600mg TID; Discussed AEs, including sedation, dizziness, somnolence, depression. Patient told to use caution when driving or working after taking the first few doses. 7) Continue physical therapy  8) RTC 2 months

## 2024-11-21 ENCOUNTER — APPOINTMENT (OUTPATIENT)
Dept: PAIN MANAGEMENT | Facility: CLINIC | Age: 69
End: 2024-11-21
Payer: MEDICARE

## 2024-11-21 VITALS — HEIGHT: 63 IN

## 2024-11-21 DIAGNOSIS — G89.4 CHRONIC PAIN SYNDROME: ICD-10-CM

## 2024-11-21 DIAGNOSIS — F11.90 OPIOID USE, UNSPECIFIED, UNCOMPLICATED: ICD-10-CM

## 2024-11-21 DIAGNOSIS — M54.17 RADICULOPATHY, LUMBOSACRAL REGION: ICD-10-CM

## 2024-11-21 DIAGNOSIS — M48.00 SPINAL STENOSIS, SITE UNSPECIFIED: ICD-10-CM

## 2024-11-21 DIAGNOSIS — M53.3 SACROCOCCYGEAL DISORDERS, NOT ELSEWHERE CLASSIFIED: ICD-10-CM

## 2024-11-21 PROCEDURE — 99214 OFFICE O/P EST MOD 30 MIN: CPT

## 2024-12-23 ENCOUNTER — APPOINTMENT (OUTPATIENT)
Dept: PAIN MANAGEMENT | Facility: CLINIC | Age: 69
End: 2024-12-23
Payer: MEDICARE

## 2024-12-23 VITALS — HEIGHT: 63 IN

## 2024-12-23 DIAGNOSIS — M48.00 SPINAL STENOSIS, SITE UNSPECIFIED: ICD-10-CM

## 2024-12-23 DIAGNOSIS — M53.3 SACROCOCCYGEAL DISORDERS, NOT ELSEWHERE CLASSIFIED: ICD-10-CM

## 2024-12-23 DIAGNOSIS — M54.17 RADICULOPATHY, LUMBOSACRAL REGION: ICD-10-CM

## 2024-12-23 DIAGNOSIS — G89.4 CHRONIC PAIN SYNDROME: ICD-10-CM

## 2024-12-23 DIAGNOSIS — F11.90 OPIOID USE, UNSPECIFIED, UNCOMPLICATED: ICD-10-CM

## 2024-12-23 PROCEDURE — 99214 OFFICE O/P EST MOD 30 MIN: CPT

## 2025-01-11 ENCOUNTER — NON-APPOINTMENT (OUTPATIENT)
Age: 70
End: 2025-01-11

## 2025-02-11 ENCOUNTER — APPOINTMENT (OUTPATIENT)
Dept: PAIN MANAGEMENT | Facility: CLINIC | Age: 70
End: 2025-02-11
Payer: MEDICARE

## 2025-02-11 PROCEDURE — 62323 NJX INTERLAMINAR LMBR/SAC: CPT

## 2025-02-20 ENCOUNTER — APPOINTMENT (OUTPATIENT)
Dept: PAIN MANAGEMENT | Facility: CLINIC | Age: 70
End: 2025-02-20
Payer: MEDICARE

## 2025-02-20 VITALS — HEIGHT: 63 IN

## 2025-02-20 DIAGNOSIS — F11.90 OPIOID USE, UNSPECIFIED, UNCOMPLICATED: ICD-10-CM

## 2025-02-20 DIAGNOSIS — M54.17 RADICULOPATHY, LUMBOSACRAL REGION: ICD-10-CM

## 2025-02-20 DIAGNOSIS — M48.00 SPINAL STENOSIS, SITE UNSPECIFIED: ICD-10-CM

## 2025-02-20 DIAGNOSIS — M53.3 SACROCOCCYGEAL DISORDERS, NOT ELSEWHERE CLASSIFIED: ICD-10-CM

## 2025-02-20 DIAGNOSIS — G89.4 CHRONIC PAIN SYNDROME: ICD-10-CM

## 2025-02-20 PROCEDURE — 99214 OFFICE O/P EST MOD 30 MIN: CPT

## 2025-04-15 ENCOUNTER — NON-APPOINTMENT (OUTPATIENT)
Age: 70
End: 2025-04-15

## 2025-04-17 ENCOUNTER — APPOINTMENT (OUTPATIENT)
Dept: PAIN MANAGEMENT | Facility: CLINIC | Age: 70
End: 2025-04-17
Payer: MEDICARE

## 2025-04-17 VITALS — HEIGHT: 63 IN

## 2025-04-17 DIAGNOSIS — M48.00 SPINAL STENOSIS, SITE UNSPECIFIED: ICD-10-CM

## 2025-04-17 DIAGNOSIS — M53.3 SACROCOCCYGEAL DISORDERS, NOT ELSEWHERE CLASSIFIED: ICD-10-CM

## 2025-04-17 DIAGNOSIS — F11.90 OPIOID USE, UNSPECIFIED, UNCOMPLICATED: ICD-10-CM

## 2025-04-17 DIAGNOSIS — M54.17 RADICULOPATHY, LUMBOSACRAL REGION: ICD-10-CM

## 2025-04-17 DIAGNOSIS — G89.4 CHRONIC PAIN SYNDROME: ICD-10-CM

## 2025-04-17 PROCEDURE — 99214 OFFICE O/P EST MOD 30 MIN: CPT

## 2025-04-17 PROCEDURE — G2211 COMPLEX E/M VISIT ADD ON: CPT

## 2025-04-27 LAB — COMPREHENSIVE SCREEN URINE: NORMAL

## 2025-06-12 ENCOUNTER — APPOINTMENT (OUTPATIENT)
Dept: PAIN MANAGEMENT | Facility: CLINIC | Age: 70
End: 2025-06-12
Payer: MEDICARE

## 2025-06-12 VITALS — HEIGHT: 63 IN

## 2025-06-12 PROCEDURE — G2211 COMPLEX E/M VISIT ADD ON: CPT

## 2025-06-12 PROCEDURE — 99214 OFFICE O/P EST MOD 30 MIN: CPT

## 2025-07-08 ENCOUNTER — APPOINTMENT (OUTPATIENT)
Dept: PAIN MANAGEMENT | Facility: CLINIC | Age: 70
End: 2025-07-08
Payer: MEDICARE

## 2025-07-08 PROCEDURE — 64483 NJX AA&/STRD TFRM EPI L/S 1: CPT | Mod: LT

## 2025-07-08 PROCEDURE — 64484 NJX AA&/STRD TFRM EPI L/S EA: CPT | Mod: LT,59

## 2025-07-29 ENCOUNTER — APPOINTMENT (OUTPATIENT)
Dept: PAIN MANAGEMENT | Facility: CLINIC | Age: 70
End: 2025-07-29
Payer: MEDICARE

## 2025-07-29 VITALS — HEIGHT: 63 IN

## 2025-07-29 DIAGNOSIS — M48.00 SPINAL STENOSIS, SITE UNSPECIFIED: ICD-10-CM

## 2025-07-29 DIAGNOSIS — M79.10 MYALGIA, UNSPECIFIED SITE: ICD-10-CM

## 2025-07-29 DIAGNOSIS — G89.4 CHRONIC PAIN SYNDROME: ICD-10-CM

## 2025-07-29 DIAGNOSIS — M54.17 RADICULOPATHY, LUMBOSACRAL REGION: ICD-10-CM

## 2025-07-29 DIAGNOSIS — F11.90 OPIOID USE, UNSPECIFIED, UNCOMPLICATED: ICD-10-CM

## 2025-07-29 PROCEDURE — 99214 OFFICE O/P EST MOD 30 MIN: CPT | Mod: 25

## 2025-07-29 PROCEDURE — 20552 NJX 1/MLT TRIGGER POINT 1/2: CPT

## 2025-08-11 ENCOUNTER — APPOINTMENT (OUTPATIENT)
Dept: PAIN MANAGEMENT | Facility: CLINIC | Age: 70
End: 2025-08-11